# Patient Record
Sex: FEMALE | Race: ASIAN | Employment: UNEMPLOYED | URBAN - METROPOLITAN AREA
[De-identification: names, ages, dates, MRNs, and addresses within clinical notes are randomized per-mention and may not be internally consistent; named-entity substitution may affect disease eponyms.]

---

## 2017-03-13 ENCOUNTER — OFFICE VISIT (OUTPATIENT)
Dept: URGENT CARE | Facility: URGENT CARE | Age: 32
End: 2017-03-13
Payer: COMMERCIAL

## 2017-03-13 VITALS
TEMPERATURE: 98.1 F | HEART RATE: 61 BPM | DIASTOLIC BLOOD PRESSURE: 75 MMHG | OXYGEN SATURATION: 99 % | BODY MASS INDEX: 24.66 KG/M2 | WEIGHT: 118 LBS | SYSTOLIC BLOOD PRESSURE: 108 MMHG

## 2017-03-13 DIAGNOSIS — R21 RASH: Primary | ICD-10-CM

## 2017-03-13 DIAGNOSIS — L30.9 ECZEMA, UNSPECIFIED TYPE: ICD-10-CM

## 2017-03-13 PROCEDURE — 99213 OFFICE O/P EST LOW 20 MIN: CPT | Performed by: FAMILY MEDICINE

## 2017-03-13 RX ORDER — PREDNISONE 20 MG/1
TABLET ORAL
Qty: 20 TABLET | Refills: 0 | Status: SHIPPED | OUTPATIENT
Start: 2017-03-13 | End: 2018-10-22

## 2017-03-13 RX ORDER — CLOBETASOL PROPIONATE 0.5 MG/G
OINTMENT TOPICAL
COMMUNITY

## 2017-03-13 NOTE — MR AVS SNAPSHOT
After Visit Summary   3/13/2017    Christie Phoenix    MRN: [id number]           Patient Information     Date Of Birth          1985        Visit Information        Provider Department      3/13/2017 5:20 PM Jose Hamlin MD [facility]        Today's Diagnoses     Rash    -  1    Eczema, unspecified type          Care Instructions      Nonspecific Dermatitis  Dermatitis is a skin rash caused by something that touches the skin and makes it irritated and inflamed.  Your skin may be red, swollen, dry, and may be cracked. Blisters may form and ooze. The rash will itch.  Dermatitis can form on the face and neck, backs of hands, forearms, genitals, and lower legs. Dermatitis is not passed from person to person.  Talk with your health care provider about what may have caused the rash. Common things that cause skin allergies are metal in jewelry, plants like poison ivy or poison oak, and certain skin care products. You will need to avoid the source of your rash in the future to prevent it from coming back. In some cases, the cause of the dermatitis may not be found.  Treatment is done to relieve itching and prevent the rash from coming back. The rash should go away in a few days to a few weeks.  Home care  The health care provider may prescribe medications to relieve swelling and itching. Follow all instructions when using these medications.    Avoid anything that heats up your skin, such as hot showers or baths, or direct sunlight. This can make itching worse.    Stay away from whatever you think caused the rash.    Bathe in warm, not hot, water. Apply a moisturizing lotion after bathing to prevent dry skin.    Avoid skin irritants such as wool or silk clothing, grease, oils, harsh soaps, and detergents.    Apply cold compresses to soothe your sores to help relieve your symptoms. Do this for 30 minutes 3 to 4 times a day. You can make a cold compress by soaking a cloth in cold water. Squeeze  out excess water. You can add colloidal oatmeal to the water to help reduce itching. For severe itching in a small area, apply an ice pack wrapped in a thin towel. Do this for 20 minutes 3 to 4 times a day.    You can also help relieve large areas of itching by taking a lukewarm bath with colloidal oatmeal added to the water.    Use hydrocortisone cream for redness and irritation, unless another medicine was prescribed. You can also use benzocaine anesthetic cream or spray.    Use oral diphenhydramine to help reduce itching. This is an antihistamine you can buy at drug and grocery stores. It can make you sleepy, so use lower doses during the daytime. Or you can use loratadine. This is an antihistamine that will not make you sleepy. Don t use diphenhydramine if you have glaucoma or have trouble urinating because of an enlarged prostate.    Wash your hands or use an antibacterial gel often to prevent the spread of the rash.  Follow-up care  Follow up with your health care provider. Make an appointment with your health care provider if your symptoms do not get better in the next 1 to 2 weeks.  When to seek medical advice  Call your health care provider right away if any of these occur:    Spreading of the rash to other parts of your body    Severe swelling of your face, eyelids, mouth, throat or tongue    Trouble urinating due to swelling in the genital area    Fever of 100.4 F (38 C) or higher    Redness or swelling that gets worse    Pain that gets worse    Foul-smelling fluid leaking from the skin    Yellow-brown crusts on the open blisters    Joint pain     4993-9172 [employer]. [address] [place] [address], [place] [address]. All rights reserved. This information is not intended as a substitute for professional medical care. Always follow your healthcare professional's instructions.        Patient Education    Prednisone Gastro-resistant tablet    Prednisone Oral solution    Prednisone Oral  tablet  Prednisone Oral tablet  What is this medicine?  PREDNISONE (PRED ni sone) is a corticosteroid. It is commonly used to treat inflammation of the skin, joints, lungs, and other organs. Common conditions treated include asthma, allergies, and arthritis. It is also used for other conditions, such as blood disorders and diseases of the adrenal glands.  This medicine may be used for other purposes; ask your health care provider or pharmacist if you have questions.  What should I tell my health care provider before I take this medicine?  They need to know if you have any of these conditions:    Cushing's syndrome    diabetes    glaucoma    heart disease    high blood pressure    infection (especially a virus infection such as chickenpox, cold sores, or herpes)    kidney disease    liver disease    mental illness    myasthenia gravis    osteoporosis    seizures    stomach or intestine problems    thyroid disease    an unusual or allergic reaction to lactose, prednisone, other medicines, foods, dyes, or preservatives    pregnant or trying to get pregnant    breast-feeding  How should I use this medicine?  Take this medicine by mouth with a glass of water. Follow the directions on the prescription label. Take this medicine with food. If you are taking this medicine once a day, take it in the morning. Do not take more medicine than you are told to take. Do not suddenly stop taking your medicine because you may develop a severe reaction. Your doctor will tell you how much medicine to take. If your doctor wants you to stop the medicine, the dose may be slowly lowered over time to avoid any side effects.  Talk to your pediatrician regarding the use of this medicine in children. Special care may be needed.  Overdosage: If you think you have taken too much of this medicine contact a poison control center or emergency room at once.  NOTE: This medicine is only for you. Do not share this medicine with others.  What if I miss  a dose?  If you miss a dose, take it as soon as you can. If it is almost time for your next dose, talk to your doctor or health care professional. You may need to miss a dose or take an extra dose. Do not take double or extra doses without advice.  What may interact with this medicine?  Do not take this medicine with any of the following medications:    metyrapone    mifepristone  This medicine may also interact with the following medications:    aminoglutethimide    amphotericin B    aspirin and aspirin-like medicines    barbiturates    certain medicines for diabetes, like glipizide or glyburide    cholestyramine    cholinesterase inhibitors    cyclosporine    digoxin    diuretics    ephedrine    female hormones, like estrogens and birth control pills    isoniazid    ketoconazole    NSAIDS, medicines for pain and inflammation, like ibuprofen or naproxen    phenytoin    rifampin    toxoids    vaccines    warfarin  This list may not describe all possible interactions. Give your health care provider a list of all the medicines, herbs, non-prescription drugs, or dietary supplements you use. Also tell them if you smoke, drink alcohol, or use illegal drugs. Some items may interact with your medicine.  What should I watch for while using this medicine?  Visit your doctor or health care professional for regular checks on your progress. If you are taking this medicine over a prolonged period, carry an identification card with your name and address, the type and dose of your medicine, and your doctor's name and address.  This medicine may increase your risk of getting an infection. Tell your doctor or health care professional if you are around anyone with measles or chickenpox, or if you develop sores or blisters that do not heal properly.  If you are going to have surgery, tell your doctor or health care professional that you have taken this medicine within the last twelve months.  Ask your doctor or health care  professional about your diet. You may need to lower the amount of salt you eat.  This medicine may affect blood sugar levels. If you have diabetes, check with your doctor or health care professional before you change your diet or the dose of your diabetic medicine.  What side effects may I notice from receiving this medicine?  Side effects that you should report to your doctor or health care professional as soon as possible:    allergic reactions like skin rash, itching or hives, swelling of the face, lips, or tongue    changes in emotions or moods    changes in vision    depressed mood    eye pain    fever or chills, cough, sore throat, pain or difficulty passing urine    increased thirst    swelling of ankles, feet  Side effects that usually do not require medical attention (report to your doctor or health care professional if they continue or are bothersome):    confusion, excitement, restlessness    headache    nausea, vomiting    skin problems, acne, thin and shiny skin    trouble sleeping    weight gain  This list may not describe all possible side effects. Call your doctor for medical advice about side effects. You may report side effects to FDA at 3-418-EEA-5961.  Where should I keep my medicine?  Keep out of the reach of children.  Store at room temperature between 15 and 30 degrees C (59 and 86 degrees F). Protect from light. Keep container tightly closed. Throw away any unused medicine after the expiration date.  NOTE:This sheet is a summary. It may not cover all possible information. If you have questions about this medicine, talk to your doctor, pharmacist, or health care provider. Copyright  2016 [employer]              Follow-ups after your visit        Who to contact     If you have questions or need follow up information about today's clinic visit or your schedule please contact [facility] directly at [phone].  Normal or non-critical lab and imaging results will be communicated  "to you by [email or web address], letter or phone within 4 business days after the clinic has received the results. If you do not hear from us within 7 days, please contact the clinic through [employer] or phone. If you have a critical or abnormal lab result, we will notify you by phone as soon as possible.  Submit refill requests through [employer] or call your pharmacy and they will forward the refill request to us. Please allow 3 business days for your refill to be completed.          Additional Information About Your Visit        Speak[employer] Information     [employer] lets you send messages to your doctor, view your test results, renew your prescriptions, schedule appointments and more. To sign up, go to [email or web address] . Click on \"Log in\" on the left side of the screen, which will take you to the Welcome page. Then click on \"Sign up Now\" on the right side of the page.     You will be asked to enter the access code listed below, as well as some personal information. Please follow the directions to create your username and password.     Your access code is: [email or web address]  Expires: 2017  6:32 PM     Your access code will  in 90 days. If you need help or a new code, please call your [facility] clinic or [phone].        Care EveryWhere ID     This is your Care EveryWhere ID. This could be used by other organizations to access your [facility] medical records  [id number]        Your Vitals Were     Pulse Temperature Pulse Oximetry BMI (Body Mass Index)          61 98.1  F (36.7  C) (Oral) 99% 24.66 kg/m2         Blood Pressure from Last 3 Encounters:   17 108/75   14 111/82   13 109/75    Weight from Last 3 Encounters:   17 118 lb (53.5 kg)   14 107 lb 6.4 oz (48.7 kg)   13 110 lb 12.8 oz (50.3 kg)              Today, you had the following     No orders found for display         Today's Medication Changes          These changes are accurate as of: 3/13/17  6:33 PM.  If you have any " questions, ask your nurse or doctor.               Start taking these medicines.        Dose/Directions    predniSONE 20 MG tablet   Commonly known as:  DELTASONE   Used for:  Rash, Eczema, unspecified type        Take 2 tabs (40 mg) by mouth daily x 3 days, 1 tabs (20 mg) daily x 3 days, then half tab tab (10 mg) daily x 3 days   Quantity:  20 tablet   Refills:  0            Where to get your medicines      These medications were sent to [facility] [address] [place], [place] - [address] [facility]  [address] [facility] [address]     Phone:  [phone]     predniSONE 20 MG tablet                Primary Care Provider    [facility]                Thank you!     Thank you for choosing [facility]  for your care. Our goal is always to provide you with excellent care. Hearing back from our patients is one way we can continue to improve our services. Please take a few minutes to complete the written survey that you may receive in the mail after your visit with us. Thank you!             Your Updated Medication List - Protect others around you: Learn how to safely use, store and throw away your medicines at www.disposemymeds.org.          This list is accurate as of: 3/13/17  6:33 PM.  Always use your most recent med list.                   Brand Name Dispense Instructions for use    clobetasol 0.05 % ointment    TEMOVATE     At bedtime as needed       predniSONE 20 MG tablet    DELTASONE    20 tablet    Take 2 tabs (40 mg) by mouth daily x 3 days, 1 tabs (20 mg) daily x 3 days, then half tab tab (10 mg) daily x 3 days       VIREAD PO      Take 300 mg by mouth 1 tab daily

## 2017-03-13 NOTE — PROGRESS NOTES
SUBJECTIVE:                                                    Christie Phoenix is a 31 year old female who presents to clinic today for the following health issues:        Eye and mouth rash x 3 weeks      Duration: 3 weeks    Description (location/character/radiation): rash    Intensity:  moderate    Accompanying signs and symptoms: eczema     History (similar episodes/previous evaluation): eczema    Precipitating or alleviating factors:     Therapies tried and outcome: benadryl        She is known to have eczema. She complains of periorbital rash, perioral rash for last 3 weeks, associated with itch. She tried some benadryl without much success. She usually apply clobetasol ointment for eczema control. She is rehab nurse by profession.       Problem list and histories reviewed & adjusted, as indicated.  Additional history: as documented    Patient Active Problem List   Diagnosis     Hepatitis B carrier     Iron deficiency anemia     Hepatitis B infection     CARDIOVASCULAR SCREENING; LDL GOAL LESS THAN 160     No past surgical history on file.    Social History   Substance Use Topics     Smoking status: Never Smoker     Smokeless tobacco: Not on file     Alcohol use No     Family History   Problem Relation Age of Onset     Allergies Mother      seasonal         Current Outpatient Prescriptions   Medication Sig Dispense Refill     Tenofovir Disoproxil Fumarate (VIREAD PO) 1 tab daily       Allergies   Allergen Reactions     Nkda [No Known Drug Allergies]      Recent Labs   Lab Test  12/14/10   1124  08/20/10   1344  04/20/09   1107   ALT  22  43  25   CR  0.60   --    --    GFRESTIMATED  >90   --    --    GFRESTBLACK  >90   --    --       BP Readings from Last 3 Encounters:   03/13/17 108/75   01/26/14 111/82   04/27/13 109/75    Wt Readings from Last 3 Encounters:   03/13/17 118 lb (53.5 kg)   01/26/14 107 lb 6.4 oz (48.7 kg)   04/27/13 110 lb 12.8 oz (50.3 kg)                  Labs reviewed in  EPIC    ROS:  Constitutional, HEENT, cardiovascular, pulmonary, gi and gu systems are negative, except as otherwise noted.    OBJECTIVE:                                                    /75  Pulse 61  Temp 98.1  F (36.7  C) (Oral)  Wt 118 lb (53.5 kg)  SpO2 99%  BMI 24.66 kg/m2  Body mass index is 24.66 kg/(m^2).  GENERAL: healthy, alert and no distress  EYES: periorbital and perioral erythematous rashes bilaterally, no blistering or drainage   NECK: no adenopathy, no asymmetry, masses, or scars and thyroid normal to palpation  RESP: lungs clear to auscultation - no rales, rhonchi or wheezes  CV: regular rate and rhythm, normal S1 S2, no S3 or S4, no murmur, click or rub, no peripheral edema and peripheral pulses strong  ABDOMEN: soft, nontender, no hepatosplenomegaly, no masses and bowel sounds normal  MS: no gross musculoskeletal defects noted, no edema  SKIN: dry skin, eczematous rashes involving neck, hands and back      ASSESSMENT/PLAN:                                                        ICD-10-CM    1. Rash R21 predniSONE (DELTASONE) 20 MG tablet   2. Eczema, unspecified type L30.9 predniSONE (DELTASONE) 20 MG tablet       Discussed in detail differentials and further management. Symptoms are likely secondary to eczema involving facial skin. Prednisone tapering dose prescribed, common side effects discussed. Suggested to avoid clobetasol ointment on facial skin. Recommended well hydration and regular moisturizer use. Suggested to follow up with PCP for considering dermatology referral. Patient understood and in agreement with the above plan. All questions are answered.   [email or web address]  [email or web address]  MEDICATIONS:   Orders Placed This Encounter   Medications     Tenofovir Disoproxil Fumarate (VIREAD PO)     Sig: Take 300 mg by mouth 1 tab daily      clobetasol (TEMOVATE) 0.05 % ointment     Sig: At bedtime as needed     predniSONE (DELTASONE) 20 MG tablet     Sig: Take 2 tabs (40 mg) by mouth daily x 3 days, 1  tabs (20 mg) daily x 3 days, then half tab tab (10 mg) daily x 3 days     Dispense:  20 tablet     Refill:  0     Patient Instructions     Nonspecific Dermatitis  Dermatitis is a skin rash caused by something that touches the skin and makes it irritated and inflamed.  Your skin may be red, swollen, dry, and may be cracked. Blisters may form and ooze. The rash will itch.  Dermatitis can form on the face and neck, backs of hands, forearms, genitals, and lower legs. Dermatitis is not passed from person to person.  Talk with your health care provider about what may have caused the rash. Common things that cause skin allergies are metal in jewelry, plants like poison ivy or poison oak, and certain skin care products. You will need to avoid the source of your rash in the future to prevent it from coming back. In some cases, the cause of the dermatitis may not be found.  Treatment is done to relieve itching and prevent the rash from coming back. The rash should go away in a few days to a few weeks.  Home care  The health care provider may prescribe medications to relieve swelling and itching. Follow all instructions when using these medications.    Avoid anything that heats up your skin, such as hot showers or baths, or direct sunlight. This can make itching worse.    Stay away from whatever you think caused the rash.    Bathe in warm, not hot, water. Apply a moisturizing lotion after bathing to prevent dry skin.    Avoid skin irritants such as wool or silk clothing, grease, oils, harsh soaps, and detergents.    Apply cold compresses to soothe your sores to help relieve your symptoms. Do this for 30 minutes 3 to 4 times a day. You can make a cold compress by soaking a cloth in cold water. Squeeze out excess water. You can add colloidal oatmeal to the water to help reduce itching. For severe itching in a small area, apply an ice pack wrapped in a thin towel. Do this for 20 minutes 3 to 4 times a day.    You can also help  relieve large areas of itching by taking a lukewarm bath with colloidal oatmeal added to the water.    Use hydrocortisone cream for redness and irritation, unless another medicine was prescribed. You can also use benzocaine anesthetic cream or spray.    Use oral diphenhydramine to help reduce itching. This is an antihistamine you can buy at drug and grocery stores. It can make you sleepy, so use lower doses during the daytime. Or you can use loratadine. This is an antihistamine that will not make you sleepy. Don t use diphenhydramine if you have glaucoma or have trouble urinating because of an enlarged prostate.    Wash your hands or use an antibacterial gel often to prevent the spread of the rash.  Follow-up care  Follow up with your health care provider. Make an appointment with your health care provider if your symptoms do not get better in the next 1 to 2 weeks.  When to seek medical advice  Call your health care provider right away if any of these occur:    Spreading of the rash to other parts of your body    Severe swelling of your face, eyelids, mouth, throat or tongue    Trouble urinating due to swelling in the genital area    Fever of 100.4 F (38 C) or higher    Redness or swelling that gets worse    Pain that gets worse    Foul-smelling fluid leaking from the skin    Yellow-brown crusts on the open blisters    Joint pain     5026-5254 [employer]. [address] [place] [id number]. All rights reserved. This information is not intended as a substitute for professional medical care. Always follow your healthcare professional's instructions.        Patient Education    Prednisone Gastro-resistant tablet    Prednisone Oral solution    Prednisone Oral tablet  Prednisone Oral tablet  What is this medicine?  PREDNISONE (PRED ni sone) is a corticosteroid. It is commonly used to treat inflammation of the skin, joints, lungs, and other organs. Common conditions treated include asthma,  allergies, and arthritis. It is also used for other conditions, such as blood disorders and diseases of the adrenal glands.  This medicine may be used for other purposes; ask your health care provider or pharmacist if you have questions.  What should I tell my health care provider before I take this medicine?  They need to know if you have any of these conditions:    Cushing's syndrome    diabetes    glaucoma    heart disease    high blood pressure    infection (especially a virus infection such as chickenpox, cold sores, or herpes)    kidney disease    liver disease    mental illness    myasthenia gravis    osteoporosis    seizures    stomach or intestine problems    thyroid disease    an unusual or allergic reaction to lactose, prednisone, other medicines, foods, dyes, or preservatives    pregnant or trying to get pregnant    breast-feeding  How should I use this medicine?  Take this medicine by mouth with a glass of water. Follow the directions on the prescription label. Take this medicine with food. If you are taking this medicine once a day, take it in the morning. Do not take more medicine than you are told to take. Do not suddenly stop taking your medicine because you may develop a severe reaction. Your doctor will tell you how much medicine to take. If your doctor wants you to stop the medicine, the dose may be slowly lowered over time to avoid any side effects.  Talk to your pediatrician regarding the use of this medicine in children. Special care may be needed.  Overdosage: If you think you have taken too much of this medicine contact a poison control center or emergency room at once.  NOTE: This medicine is only for you. Do not share this medicine with others.  What if I miss a dose?  If you miss a dose, take it as soon as you can. If it is almost time for your next dose, talk to your doctor or health care professional. You may need to miss a dose or take an extra dose. Do not take double or extra doses  without advice.  What may interact with this medicine?  Do not take this medicine with any of the following medications:    metyrapone    mifepristone  This medicine may also interact with the following medications:    aminoglutethimide    amphotericin B    aspirin and aspirin-like medicines    barbiturates    certain medicines for diabetes, like glipizide or glyburide    cholestyramine    cholinesterase inhibitors    cyclosporine    digoxin    diuretics    ephedrine    female hormones, like estrogens and birth control pills    isoniazid    ketoconazole    NSAIDS, medicines for pain and inflammation, like ibuprofen or naproxen    phenytoin    rifampin    toxoids    vaccines    warfarin  This list may not describe all possible interactions. Give your health care provider a list of all the medicines, herbs, non-prescription drugs, or dietary supplements you use. Also tell them if you smoke, drink alcohol, or use illegal drugs. Some items may interact with your medicine.  What should I watch for while using this medicine?  Visit your doctor or health care professional for regular checks on your progress. If you are taking this medicine over a prolonged period, carry an identification card with your name and address, the type and dose of your medicine, and your doctor's name and address.  This medicine may increase your risk of getting an infection. Tell your doctor or health care professional if you are around anyone with measles or chickenpox, or if you develop sores or blisters that do not heal properly.  If you are going to have surgery, tell your doctor or health care professional that you have taken this medicine within the last twelve months.  Ask your doctor or health care professional about your diet. You may need to lower the amount of salt you eat.  This medicine may affect blood sugar levels. If you have diabetes, check with your doctor or health care professional before you change your diet or the dose of  your diabetic medicine.  What side effects may I notice from receiving this medicine?  Side effects that you should report to your doctor or health care professional as soon as possible:    allergic reactions like skin rash, itching or hives, swelling of the face, lips, or tongue    changes in emotions or moods    changes in vision    depressed mood    eye pain    fever or chills, cough, sore throat, pain or difficulty passing urine    increased thirst    swelling of ankles, feet  Side effects that usually do not require medical attention (report to your doctor or health care professional if they continue or are bothersome):    confusion, excitement, restlessness    headache    nausea, vomiting    skin problems, acne, thin and shiny skin    trouble sleeping    weight gain  This list may not describe all possible side effects. Call your doctor for medical advice about side effects. You may report side effects to FDA at 5-833-FDA-6006.  Where should I keep my medicine?  Keep out of the reach of children.  Store at room temperature between 15 and 30 degrees C (59 and 86 degrees F). Protect from light. Keep container tightly closed. Throw away any unused medicine after the expiration date.  NOTE:This sheet is a summary. It may not cover all possible information. If you have questions about this medicine, talk to your doctor, pharmacist, or health care provider. Copyright  2016 [employer]            Jose Hamlin MD  [facility]

## 2017-03-13 NOTE — PATIENT INSTRUCTIONS
Nonspecific Dermatitis  Dermatitis is a skin rash caused by something that touches the skin and makes it irritated and inflamed.  Your skin may be red, swollen, dry, and may be cracked. Blisters may form and ooze. The rash will itch.  Dermatitis can form on the face and neck, backs of hands, forearms, genitals, and lower legs. Dermatitis is not passed from person to person.  Talk with your health care provider about what may have caused the rash. Common things that cause skin allergies are metal in jewelry, plants like poison ivy or poison oak, and certain skin care products. You will need to avoid the source of your rash in the future to prevent it from coming back. In some cases, the cause of the dermatitis may not be found.  Treatment is done to relieve itching and prevent the rash from coming back. The rash should go away in a few days to a few weeks.  Home care  The health care provider may prescribe medications to relieve swelling and itching. Follow all instructions when using these medications.    Avoid anything that heats up your skin, such as hot showers or baths, or direct sunlight. This can make itching worse.    Stay away from whatever you think caused the rash.    Bathe in warm, not hot, water. Apply a moisturizing lotion after bathing to prevent dry skin.    Avoid skin irritants such as wool or silk clothing, grease, oils, harsh soaps, and detergents.    Apply cold compresses to soothe your sores to help relieve your symptoms. Do this for 30 minutes 3 to 4 times a day. You can make a cold compress by soaking a cloth in cold water. Squeeze out excess water. You can add colloidal oatmeal to the water to help reduce itching. For severe itching in a small area, apply an ice pack wrapped in a thin towel. Do this for 20 minutes 3 to 4 times a day.    You can also help relieve large areas of itching by taking a lukewarm bath with colloidal oatmeal added to the water.    Use hydrocortisone cream for redness  and irritation, unless another medicine was prescribed. You can also use benzocaine anesthetic cream or spray.    Use oral diphenhydramine to help reduce itching. This is an antihistamine you can buy at drug and grocery stores. It can make you sleepy, so use lower doses during the daytime. Or you can use loratadine. This is an antihistamine that will not make you sleepy. Don t use diphenhydramine if you have glaucoma or have trouble urinating because of an enlarged prostate.    Wash your hands or use an antibacterial gel often to prevent the spread of the rash.  Follow-up care  Follow up with your health care provider. Make an appointment with your health care provider if your symptoms do not get better in the next 1 to 2 weeks.  When to seek medical advice  Call your health care provider right away if any of these occur:    Spreading of the rash to other parts of your body    Severe swelling of your face, eyelids, mouth, throat or tongue    Trouble urinating due to swelling in the genital area    Fever of 100.4 F (38 C) or higher    Redness or swelling that gets worse    Pain that gets worse    Foul-smelling fluid leaking from the skin    Yellow-brown crusts on the open blisters    Joint pain     3002-5671 [employer]. [address] [place] [address], [place] [address]. All rights reserved. This information is not intended as a substitute for professional medical care. Always follow your healthcare professional's instructions.        Patient Education    Prednisone Gastro-resistant tablet    Prednisone Oral solution    Prednisone Oral tablet  Prednisone Oral tablet  What is this medicine?  PREDNISONE (PRED ni sone) is a corticosteroid. It is commonly used to treat inflammation of the skin, joints, lungs, and other organs. Common conditions treated include asthma, allergies, and arthritis. It is also used for other conditions, such as blood disorders and diseases of the adrenal glands.  This medicine may be  used for other purposes; ask your health care provider or pharmacist if you have questions.  What should I tell my health care provider before I take this medicine?  They need to know if you have any of these conditions:    Cushing's syndrome    diabetes    glaucoma    heart disease    high blood pressure    infection (especially a virus infection such as chickenpox, cold sores, or herpes)    kidney disease    liver disease    mental illness    myasthenia gravis    osteoporosis    seizures    stomach or intestine problems    thyroid disease    an unusual or allergic reaction to lactose, prednisone, other medicines, foods, dyes, or preservatives    pregnant or trying to get pregnant    breast-feeding  How should I use this medicine?  Take this medicine by mouth with a glass of water. Follow the directions on the prescription label. Take this medicine with food. If you are taking this medicine once a day, take it in the morning. Do not take more medicine than you are told to take. Do not suddenly stop taking your medicine because you may develop a severe reaction. Your doctor will tell you how much medicine to take. If your doctor wants you to stop the medicine, the dose may be slowly lowered over time to avoid any side effects.  Talk to your pediatrician regarding the use of this medicine in children. Special care may be needed.  Overdosage: If you think you have taken too much of this medicine contact a poison control center or emergency room at once.  NOTE: This medicine is only for you. Do not share this medicine with others.  What if I miss a dose?  If you miss a dose, take it as soon as you can. If it is almost time for your next dose, talk to your doctor or health care professional. You may need to miss a dose or take an extra dose. Do not take double or extra doses without advice.  What may interact with this medicine?  Do not take this medicine with any of the following  medications:    metyrapone    mifepristone  This medicine may also interact with the following medications:    aminoglutethimide    amphotericin B    aspirin and aspirin-like medicines    barbiturates    certain medicines for diabetes, like glipizide or glyburide    cholestyramine    cholinesterase inhibitors    cyclosporine    digoxin    diuretics    ephedrine    female hormones, like estrogens and birth control pills    isoniazid    ketoconazole    NSAIDS, medicines for pain and inflammation, like ibuprofen or naproxen    phenytoin    rifampin    toxoids    vaccines    warfarin  This list may not describe all possible interactions. Give your health care provider a list of all the medicines, herbs, non-prescription drugs, or dietary supplements you use. Also tell them if you smoke, drink alcohol, or use illegal drugs. Some items may interact with your medicine.  What should I watch for while using this medicine?  Visit your doctor or health care professional for regular checks on your progress. If you are taking this medicine over a prolonged period, carry an identification card with your name and address, the type and dose of your medicine, and your doctor's name and address.  This medicine may increase your risk of getting an infection. Tell your doctor or health care professional if you are around anyone with measles or chickenpox, or if you develop sores or blisters that do not heal properly.  If you are going to have surgery, tell your doctor or health care professional that you have taken this medicine within the last twelve months.  Ask your doctor or health care professional about your diet. You may need to lower the amount of salt you eat.  This medicine may affect blood sugar levels. If you have diabetes, check with your doctor or health care professional before you change your diet or the dose of your diabetic medicine.  What side effects may I notice from receiving this medicine?  Side effects that you  should report to your doctor or health care professional as soon as possible:    allergic reactions like skin rash, itching or hives, swelling of the face, lips, or tongue    changes in emotions or moods    changes in vision    depressed mood    eye pain    fever or chills, cough, sore throat, pain or difficulty passing urine    increased thirst    swelling of ankles, feet  Side effects that usually do not require medical attention (report to your doctor or health care professional if they continue or are bothersome):    confusion, excitement, restlessness    headache    nausea, vomiting    skin problems, acne, thin and shiny skin    trouble sleeping    weight gain  This list may not describe all possible side effects. Call your doctor for medical advice about side effects. You may report side effects to FDA at 9-026-FDA-7624.  Where should I keep my medicine?  Keep out of the reach of children.  Store at room temperature between 15 and 30 degrees C (59 and 86 degrees F). Protect from light. Keep container tightly closed. Throw away any unused medicine after the expiration date.  NOTE:This sheet is a summary. It may not cover all possible information. If you have questions about this medicine, talk to your doctor, pharmacist, or health care provider. Copyright  2016 [employer]

## 2018-10-05 DIAGNOSIS — B18.1 HEPATITIS B CARRIER (H): Primary | ICD-10-CM

## 2018-10-22 ENCOUNTER — OFFICE VISIT (OUTPATIENT)
Dept: GASTROENTEROLOGY | Facility: CLINIC | Age: 33
End: 2018-10-22
Attending: INTERNAL MEDICINE
Payer: COMMERCIAL

## 2018-10-22 VITALS
WEIGHT: 116.6 LBS | TEMPERATURE: 98.2 F | HEIGHT: 58 IN | HEART RATE: 63 BPM | BODY MASS INDEX: 24.48 KG/M2 | SYSTOLIC BLOOD PRESSURE: 115 MMHG | DIASTOLIC BLOOD PRESSURE: 71 MMHG | OXYGEN SATURATION: 98 %

## 2018-10-22 DIAGNOSIS — B18.1 CHRONIC VIRAL HEPATITIS B WITHOUT DELTA AGENT AND WITHOUT COMA (H): Primary | ICD-10-CM

## 2018-10-22 DIAGNOSIS — B18.1 HEPATITIS B CARRIER (H): ICD-10-CM

## 2018-10-22 DIAGNOSIS — B18.1 CHRONIC VIRAL HEPATITIS B WITHOUT DELTA AGENT AND WITHOUT COMA (H): ICD-10-CM

## 2018-10-22 LAB
ALBUMIN SERPL-MCNC: 3.9 G/DL (ref 3.4–5)
ALP SERPL-CCNC: 38 U/L (ref 40–150)
ALT SERPL W P-5'-P-CCNC: 24 U/L (ref 0–50)
ANION GAP SERPL CALCULATED.3IONS-SCNC: 7 MMOL/L (ref 3–14)
AST SERPL W P-5'-P-CCNC: 17 U/L (ref 0–45)
BILIRUB DIRECT SERPL-MCNC: 0.2 MG/DL (ref 0–0.2)
BILIRUB SERPL-MCNC: 0.9 MG/DL (ref 0.2–1.3)
BUN SERPL-MCNC: 16 MG/DL (ref 7–30)
CALCIUM SERPL-MCNC: 8.4 MG/DL (ref 8.5–10.1)
CHLORIDE SERPL-SCNC: 108 MMOL/L (ref 94–109)
CO2 SERPL-SCNC: 23 MMOL/L (ref 20–32)
CREAT SERPL-MCNC: 0.74 MG/DL (ref 0.52–1.04)
ERYTHROCYTE [DISTWIDTH] IN BLOOD BY AUTOMATED COUNT: 11.8 % (ref 10–15)
GFR SERPL CREATININE-BSD FRML MDRD: >90 ML/MIN/1.7M2
GLUCOSE SERPL-MCNC: 87 MG/DL (ref 70–99)
HCT VFR BLD AUTO: 42.7 % (ref 35–47)
HGB BLD-MCNC: 13.6 G/DL (ref 11.7–15.7)
INR PPP: 1.04 (ref 0.86–1.14)
MCH RBC QN AUTO: 30.3 PG (ref 26.5–33)
MCHC RBC AUTO-ENTMCNC: 31.9 G/DL (ref 31.5–36.5)
MCV RBC AUTO: 95 FL (ref 78–100)
PLATELET # BLD AUTO: 218 10E9/L (ref 150–450)
POTASSIUM SERPL-SCNC: 4.4 MMOL/L (ref 3.4–5.3)
PROT SERPL-MCNC: 7.6 G/DL (ref 6.8–8.8)
RBC # BLD AUTO: 4.49 10E12/L (ref 3.8–5.2)
SODIUM SERPL-SCNC: 137 MMOL/L (ref 133–144)
WBC # BLD AUTO: 5.8 10E9/L (ref 4–11)

## 2018-10-22 PROCEDURE — 85610 PROTHROMBIN TIME: CPT | Performed by: INTERNAL MEDICINE

## 2018-10-22 PROCEDURE — 80048 BASIC METABOLIC PNL TOTAL CA: CPT | Performed by: INTERNAL MEDICINE

## 2018-10-22 PROCEDURE — 80076 HEPATIC FUNCTION PANEL: CPT | Performed by: INTERNAL MEDICINE

## 2018-10-22 PROCEDURE — 85027 COMPLETE CBC AUTOMATED: CPT | Performed by: INTERNAL MEDICINE

## 2018-10-22 PROCEDURE — 36415 COLL VENOUS BLD VENIPUNCTURE: CPT | Performed by: INTERNAL MEDICINE

## 2018-10-22 PROCEDURE — G0463 HOSPITAL OUTPT CLINIC VISIT: HCPCS | Mod: ZF

## 2018-10-22 PROCEDURE — 87517 HEPATITIS B DNA QUANT: CPT | Performed by: INTERNAL MEDICINE

## 2018-10-22 PROCEDURE — 91200 LIVER ELASTOGRAPHY: CPT | Mod: ZF

## 2018-10-22 RX ORDER — TENOFOVIR DISOPROXIL FUMARATE 300 MG/1
300 TABLET, FILM COATED ORAL DAILY
Qty: 90 TABLET | Refills: 3 | Status: SHIPPED | OUTPATIENT
Start: 2018-10-22 | End: 2019-11-25

## 2018-10-22 ASSESSMENT — PAIN SCALES - GENERAL: PAINLEVEL: NO PAIN (0)

## 2018-10-22 NOTE — LETTER
Date:October 23, 2018      Patient was self referred, no letter generated. Do not send.        [facility] Health Information

## 2018-10-22 NOTE — LETTER
"10/22/2018      RE: Christie Phoenix  [address] [facility] [address]       A/P  32 year old yo female with HBV. Has been on tenofovir for 7 years. Liver tests have been normal and  HBV DNA has been undetectable. Today's pending.     CT done July showed normal liver.  Will get fibrosis scan today.  Continue daily tenofovir and every 6 months US and labs. RTC 1 year.    Reviewed side effects from tenofovir.    Refuses flu shot.  This was a 25 minute visit, over 50% counseling and coordination of care.    Samra Steinberg MD  Hepatology/Liver Transplant  Medical Director, Liver Transplantation  [facility]  ==================================================================      S:  32 year old you female with hepatitis B. She followed with me at . On Viread for about 7 years. She takes it daily and has had a negative HBV DNA and normal liver tests for a number of years.    She had a c/o easy bruising in the past and saw hematology. Testing did not show any abnormalities. She asks today about long term side effects from tenofovir.      Hep B e Ag pos  Hep b e Ruby neg  HBV DNA pending  Liver tests pending  HIV neg 2015  HCV no record    CT 7/23/18 Liver normal. Done for pyelonephritis    Doing well. No problems getting medication on time and taking it every day. No new health problems. No new family history.    Soc: She works at [employer]. She has 6 children. Ages 7-16. No plans for any more kids.    O:  /71  Pulse 63  Temp 98.2  F (36.8  C) (Oral)  Ht 1.473 m (4' 10\")  Wt 52.9 kg (116 lb 9.6 oz)  SpO2 98%  BMI 24.37 kg/m2  Gen: No acute distress  Head: Normocephalic atraumatic  Eyes: Sclera anicteric  Neck: No cervical lymphadenopathy  Respiratory: Clear to auscultation bilaterally, no overt wheezing or rales  CV: RRR   Abdomen:  Soft, nontender, nondistended, normal bowel sounds  Extrem: No edema  Skin: No jaundice, spider angiomata or palmar erythema.  No rashes.   Neuro: Alert and " oriented. No asterixis.    MSK: Grossly Intact  Psych: Normal speech, normal affect    Current Outpatient Prescriptions   Medication     clobetasol (TEMOVATE) 0.05 % ointment     Tenofovir Disoproxil Fumarate (VIREAD PO)     No current facility-administered medications for this visit.        Samra Steinberg MD

## 2018-10-22 NOTE — PROGRESS NOTES
"A/P  32 year old yo female with HBV. Has been on tenofovir for 7 years. Liver tests have been normal and  HBV DNA has been undetectable. Today's pending.     CT done July showed normal liver.  Will get fibrosis scan today.  Continue daily tenofovir and every 6 months US and labs. RTC 1 year.    Reviewed side effects from tenofovir.    Refuses flu shot.  This was a 25 minute visit, over 50% counseling and coordination of care.    Samra Steinberg MD  Hepatology/Liver Transplant  Medical Director, Liver Transplantation  [facility]  ==================================================================      S:  32 year old you female with hepatitis B. She followed with me at . On Viread for about 7 years. She takes it daily and has had a negative HBV DNA and normal liver tests for a number of years.    She had a c/o easy bruising in the past and saw hematology. Testing did not show any abnormalities. She asks today about long term side effects from tenofovir.      Hep B e Ag pos  Hep b e Ruby neg  HBV DNA pending  Liver tests pending  HIV neg 2015  HCV no record    CT 7/23/18 Liver normal. Done for pyelonephritis    Doing well. No problems getting medication on time and taking it every day. No new health problems. No new family history.    Soc: She works at [employer]. She has 6 children. Ages 7-16. No plans for any more kids.    O:  /71  Pulse 63  Temp 98.2  F (36.8  C) (Oral)  Ht 1.473 m (4' 10\")  Wt 52.9 kg (116 lb 9.6 oz)  SpO2 98%  BMI 24.37 kg/m2  Gen: No acute distress  Head: Normocephalic atraumatic  Eyes: Sclera anicteric  Neck: No cervical lymphadenopathy  Respiratory: Clear to auscultation bilaterally, no overt wheezing or rales  CV: RRR   Abdomen:  Soft, nontender, nondistended, normal bowel sounds  Extrem: No edema  Skin: No jaundice, spider angiomata or palmar erythema.  No rashes.   Neuro: Alert and oriented. No asterixis.    MSK: Grossly Intact  Psych: Normal speech, normal " affect    Current Outpatient Prescriptions   Medication     clobetasol (TEMOVATE) 0.05 % ointment     Tenofovir Disoproxil Fumarate (VIREAD PO)     No current facility-administered medications for this visit.

## 2018-10-22 NOTE — MR AVS SNAPSHOT
After Visit Summary   10/22/2018    Christie Phoenix    MRN: [id number]           Patient Information     Date Of Birth          1985        Visit Information        Provider Department      10/22/2018 9:30 AM Samra Steinberg MD [facility] Hepatology        Today's Diagnoses     Chronic viral hepatitis B without delta agent and without coma (H)    -  1       Follow-ups after your visit        Follow-up notes from your care team     Return in about 1 year (around 10/22/2019).      Your next 10 appointments already scheduled     Oct 18, 2019  8:30 AM CDT   Lab with  LAB   [facility] Lab ([facility])    [address]  1st Floor  [facility] [id number] [phone]            Oct 18, 2019  9:30 AM CDT   (Arrive by 9:15 AM)   Return General Liver with Samra Steinberg MD   [facility] Hepatology ([facility])    [address]  [address]  [facility] [address] [phone]              Who to contact     If you have questions or need follow up information about today's clinic visit or your schedule please contact [facility] HEPATOLOGY directly at [phone].  Normal or non-critical lab and imaging results will be communicated to you by MyChart, letter or phone within 4 business days after the clinic has received the results. If you do not hear from us within 7 days, please contact the clinic through MyChart or phone. If you have a critical or abnormal lab result, we will notify you by phone as soon as possible.  Submit refill requests through [employer] or call your pharmacy and they will forward the refill request to us. Please allow 3 business days for your refill to be completed.          Additional Information About Your Visit        [employer] Information     Tenfoot lets you send messages to your doctor, view your test results, renew your prescriptions, schedule appointments and more. To sign up, go to  [email or web address] . Click on \"Log in\" on the left side of the screen, which will take you to the Welcome page. Then click on \"Sign up Now\" on the right side of the page.     You will be asked to enter the access code listed below, as well as some personal information. Please follow the directions to create your username and password.     Your access code is: [email or web address]  Expires: 2019  6:30 AM     Your access code will  in 90 days. If you need help or a new code, please call your [facility] clinic or [phone].        Care EveryWhere ID     This is your Care EveryWhere ID. This could be used by other organizations to access your [facility] medical records  [id number]        Your Vitals Were     Pulse Temperature Height Pulse Oximetry BMI (Body Mass Index)       63 98.2  F (36.8  C) (Oral) 1.473 m (4' 10\") 98% 24.37 kg/m2        Blood Pressure from Last 3 Encounters:   10/22/18 115/71   17 108/75   14 111/82    Weight from Last 3 Encounters:   10/22/18 52.9 kg (116 lb 9.6 oz)   17 53.5 kg (118 lb)   14 48.7 kg (107 lb 6.4 oz)              Today, you had the following     No orders found for display         Today's Medication Changes          These changes are accurate as of 10/22/18  5:10 PM.  If you have any questions, ask your nurse or doctor.               These medicines have changed or have updated prescriptions.        Dose/Directions    tenofovir 300 MG tablet   Commonly known as:  VIREAD   This may have changed:    - medication strength  - when to take this   Used for:  Chronic viral hepatitis B without delta agent and without coma (H)   Changed by:  Samra Steinberg MD        Dose:  300 mg   Take 1 tablet (300 mg) by mouth daily 1 tab daily   Quantity:  90 tablet   Refills:  3            Where to get your medicines      These medications were sent to [facility] - [place], [place] - [address], [place] " [address]     Phone:  [phone]     tenofovir 300 MG tablet                Primary Care Provider    None Specified       No primary provider on file.        Equal Access to Services     ORA NORRIS : Hadii aad ku hadfabyneyda Hansel, rasheedaivelisse simeonsiobhanha, zandra jeffmichael veronndayo, froilan kaylain hayaajaky juniorcosme gong sanjuana noriega. So [facility] [phone].    ATENCIÓN: Si habla español, tiene a mo disposición servicios gratuitos de asistencia lingüística. Llame al [phone].    We comply with applicable federal civil rights laws and [place] laws. We do not discriminate on the basis of race, color, national origin, age, disability, sex, sexual orientation, or gender identity.            Thank you!     Thank you for choosing [facility] HEPATOLOGY  for your care. Our goal is always to provide you with excellent care. Hearing back from our patients is one way we can continue to improve our services. Please take a few minutes to complete the written survey that you may receive in the mail after your visit with us. Thank you!             Your Updated Medication List - Protect others around you: Learn how to safely use, store and throw away your medicines at www.disposemymeds.org.          This list is accurate as of 10/22/18  5:10 PM.  Always use your most recent med list.                   Brand Name Dispense Instructions for use Diagnosis    clobetasol 0.05 % ointment    TEMOVATE     At bedtime as needed        tenofovir 300 MG tablet    VIREAD    90 tablet    Take 1 tablet (300 mg) by mouth daily 1 tab daily    Chronic viral hepatitis B without delta agent and without coma (H)

## 2018-10-23 LAB
HBV DNA SERPL NAA+PROBE-ACNC: NORMAL [IU]/ML
HBV DNA SERPL NAA+PROBE-LOG IU: NORMAL {LOG_IU}/ML

## 2018-10-24 DIAGNOSIS — B18.1 HEPATITIS B CARRIER (H): Primary | ICD-10-CM

## 2019-11-15 DIAGNOSIS — B18.1 HEPATITIS B CARRIER (H): Primary | ICD-10-CM

## 2019-11-15 DIAGNOSIS — B18.1 CHRONIC VIRAL HEPATITIS B WITHOUT DELTA AGENT AND WITHOUT COMA (H): ICD-10-CM

## 2019-11-21 ENCOUNTER — PRE VISIT (OUTPATIENT)
Dept: GASTROENTEROLOGY | Facility: CLINIC | Age: 34
End: 2019-11-21

## 2019-11-21 NOTE — PROGRESS NOTES
Was the patient contacted by phone and reminded of the upcoming visit? message left    Was the patient instructed to bring a current list of all medications to the appointment or instructed to bring in all medication bottles? Yes     Was the patient instructed to arrive prior to the appointment time to have ordered labs drawn? Yes     Were the needed lab orders placed? Yes    Was lab appointment made 1 hour or more prior to visit? Yes    Patient instructed to arrive early for check-in    Maria E Lynch [facility]  11/21/2019 11:43 AM

## 2019-11-25 ENCOUNTER — OFFICE VISIT (OUTPATIENT)
Dept: GASTROENTEROLOGY | Facility: CLINIC | Age: 34
End: 2019-11-25
Attending: INTERNAL MEDICINE
Payer: COMMERCIAL

## 2019-11-25 VITALS
HEART RATE: 52 BPM | WEIGHT: 121.2 LBS | OXYGEN SATURATION: 100 % | SYSTOLIC BLOOD PRESSURE: 125 MMHG | DIASTOLIC BLOOD PRESSURE: 85 MMHG | BODY MASS INDEX: 25.33 KG/M2 | TEMPERATURE: 97.7 F

## 2019-11-25 DIAGNOSIS — B18.1 HEPATITIS B CARRIER (H): ICD-10-CM

## 2019-11-25 DIAGNOSIS — B16.1 VIRAL HEPATITIS B WITH HEPATITIS DELTA, WITHOUT HEPATIC COMA, UNSPECIFIED CHRONICITY: Primary | ICD-10-CM

## 2019-11-25 DIAGNOSIS — B18.1 CHRONIC VIRAL HEPATITIS B WITHOUT DELTA AGENT AND WITHOUT COMA (H): ICD-10-CM

## 2019-11-25 LAB
ALBUMIN SERPL-MCNC: 4.2 G/DL (ref 3.4–5)
ALP SERPL-CCNC: 50 U/L (ref 40–150)
ALT SERPL W P-5'-P-CCNC: 29 U/L (ref 0–50)
ANION GAP SERPL CALCULATED.3IONS-SCNC: 4 MMOL/L (ref 3–14)
AST SERPL W P-5'-P-CCNC: 22 U/L (ref 0–45)
BILIRUB DIRECT SERPL-MCNC: 0.2 MG/DL (ref 0–0.2)
BILIRUB SERPL-MCNC: 0.9 MG/DL (ref 0.2–1.3)
BUN SERPL-MCNC: 13 MG/DL (ref 7–30)
CALCIUM SERPL-MCNC: 9.1 MG/DL (ref 8.5–10.1)
CHLORIDE SERPL-SCNC: 107 MMOL/L (ref 94–109)
CO2 SERPL-SCNC: 26 MMOL/L (ref 20–32)
CREAT SERPL-MCNC: 0.8 MG/DL (ref 0.52–1.04)
ERYTHROCYTE [DISTWIDTH] IN BLOOD BY AUTOMATED COUNT: 11.9 % (ref 10–15)
GFR SERPL CREATININE-BSD FRML MDRD: >90 ML/MIN/{1.73_M2}
GLUCOSE SERPL-MCNC: 84 MG/DL (ref 70–99)
HCT VFR BLD AUTO: 44.7 % (ref 35–47)
HGB BLD-MCNC: 14.3 G/DL (ref 11.7–15.7)
INR PPP: 1.03 (ref 0.86–1.14)
MCH RBC QN AUTO: 30 PG (ref 26.5–33)
MCHC RBC AUTO-ENTMCNC: 32 G/DL (ref 31.5–36.5)
MCV RBC AUTO: 94 FL (ref 78–100)
PLATELET # BLD AUTO: 205 10E9/L (ref 150–450)
POTASSIUM SERPL-SCNC: 3.9 MMOL/L (ref 3.4–5.3)
PROT SERPL-MCNC: 8.2 G/DL (ref 6.8–8.8)
RBC # BLD AUTO: 4.76 10E12/L (ref 3.8–5.2)
SODIUM SERPL-SCNC: 138 MMOL/L (ref 133–144)
WBC # BLD AUTO: 4.5 10E9/L (ref 4–11)

## 2019-11-25 PROCEDURE — 36415 COLL VENOUS BLD VENIPUNCTURE: CPT | Performed by: INTERNAL MEDICINE

## 2019-11-25 PROCEDURE — 80076 HEPATIC FUNCTION PANEL: CPT | Performed by: INTERNAL MEDICINE

## 2019-11-25 PROCEDURE — 80048 BASIC METABOLIC PNL TOTAL CA: CPT | Performed by: INTERNAL MEDICINE

## 2019-11-25 PROCEDURE — G0463 HOSPITAL OUTPT CLINIC VISIT: HCPCS | Mod: ZF

## 2019-11-25 PROCEDURE — 87517 HEPATITIS B DNA QUANT: CPT | Performed by: INTERNAL MEDICINE

## 2019-11-25 PROCEDURE — 85027 COMPLETE CBC AUTOMATED: CPT | Performed by: INTERNAL MEDICINE

## 2019-11-25 PROCEDURE — 85610 PROTHROMBIN TIME: CPT | Performed by: INTERNAL MEDICINE

## 2019-11-25 RX ORDER — TENOFOVIR DISOPROXIL FUMARATE 300 MG/1
300 TABLET, FILM COATED ORAL DAILY
Qty: 90 TABLET | Refills: 3 | Status: SHIPPED | OUTPATIENT
Start: 2019-11-25 | End: 2020-11-20

## 2019-11-25 ASSESSMENT — PAIN SCALES - GENERAL: PAINLEVEL: NO PAIN (0)

## 2019-11-25 NOTE — LETTER
11/25/2019      RE: Christie Phoenix  [address]  [facility] [address]       A/P  34 year old yo female with HBV. Has been on tenofovir  since 2011. Liver tests have been normal and HBV DNA has been undetectable.    Fibrosis scan 2018 F0-F1.  Continue daily tenofovir and every 6 months US and labs. RTC 1 year.    This was a 20 minute visit, over 50% counseling and coordination of care.     Samra Steinberg MD  Hepatology/Liver Transplant  Medical Director, Liver Transplantation  [facility]  ==================================================================  S:  34 year old you female with hepatitis B. She followed with me at  in the past. On Viread since about 2011. She takes it daily and has had a negative HBV DNA and normal liver tests for a number of years.    Fibrosis scan 2018 F0-F1.     She had a c/o easy bruising in the past and saw hematology. Testing did not show any abnormalities.  She said she still has this. Otherwise she said she is feeling well.      Hep B e Ag pos  Hep b e Ruby neg  HBV DNA pending  Liver tests pending  HIV neg 2015  HCV no record     CT 7/23/18 Liver normal. Done for pyelonephritis    Doing well. No problems getting medication on time and taking it every day. No new health problems. No new family history.    Lab Test 11/25/19  0843   PROTTOTAL 8.2   ALBUMIN 4.2   BILITOTAL 0.9   ALKPHOS 50   AST 22   ALT 29     CBC RESULTS:   Recent Labs   Lab Test 11/25/19  0843   WBC 4.5   RBC 4.76   HGB 14.3   HCT 44.7   MCV 94   MCH 30.0   MCHC 32.0   RDW 11.9        Soc: She works at [employer]. She has 6 children. Ages 8-167 No plans for any more kids.     O:  /85 (BP Location: Right arm, Patient Position: Sitting, Cuff Size: Adult Regular)   Pulse 52   Temp 97.7  F (36.5  C)   Wt 55 kg (121 lb 3.2 oz)   SpO2 100%   BMI 25.33 kg/m     Gen: No acute distress  Head: Normocephalic atraumatic  Eyes: Sclera anicteric  Extrem: No edema  Skin: No jaundice, spider  angiomata or palmar erythema.  No rashes.   Neuro: Alert and oriented. No asterixis.    MSK: Grossly Intact  Psych: Normal speech, normal affect    Samra Steinberg MD

## 2019-11-25 NOTE — PROGRESS NOTES
A/P  34 year old yo female with HBV. Has been on tenofovir since 2011. Liver tests have been normal and HBV DNA has been undetectable.    Fibrosis scan 2018 F0-F1.  Continue daily tenofovir and every 6 months US and labs. RTC 1 year.    This was a 20 minute visit, over 50% counseling and coordination of care.     Samra Steinberg MD  Hepatology/Liver Transplant  Medical Director, Liver Transplantation  [facility]  ==================================================================  S:  34 year old you female with hepatitis B. She followed with me at  in the past. On Viread since about 2011. She takes it daily and has had a negative HBV DNA and normal liver tests for a number of years.    Fibrosis scan 2018 F0-F1.     She had a c/o easy bruising in the past and saw hematology. Testing did not show any abnormalities. She said she still has this. Otherwise she said she is feeling well.      Hep B e Ag pos  Hep b e Ruby neg  HBV DNA pending  Liver tests pending  HIV neg 2015  HCV no record     CT 7/23/18 Liver normal. Done for pyelonephritis    Doing well. No problems getting medication on time and taking it every day. No new health problems. No new family history.    Lab Test 11/25/19  0843   PROTTOTAL 8.2   ALBUMIN 4.2   BILITOTAL 0.9   ALKPHOS 50   AST 22   ALT 29     CBC RESULTS:   Recent Labs   Lab Test 11/25/19  0843   WBC 4.5   RBC 4.76   HGB 14.3   HCT 44.7   MCV 94   MCH 30.0   MCHC 32.0   RDW 11.9        Soc: She works at [employer]. She has 6 children. Ages 8-167 No plans for any more kids.     O:  /85 (BP Location: Right arm, Patient Position: Sitting, Cuff Size: Adult Regular)   Pulse 52   Temp 97.7  F (36.5  C)   Wt 55 kg (121 lb 3.2 oz)   SpO2 100%   BMI 25.33 kg/m    Gen: No acute distress  Head: Normocephalic atraumatic  Eyes: Sclera anicteric  Extrem: No edema  Skin: No jaundice, spider angiomata or palmar erythema.  No rashes.   Neuro: Alert and oriented. No asterixis.     MSK: Grossly Intact  Psych: Normal speech, normal affect

## 2019-11-25 NOTE — LETTER
Date:November 26, 2019      Patient was self referred, no letter generated. Do not send.        [facility] Health Information

## 2019-11-25 NOTE — NURSING NOTE
Chief Complaint   Patient presents with     RECHECK     annual follow up       /85 (BP Location: Right arm, Patient Position: Sitting, Cuff Size: Adult Regular)   Pulse 52   Temp 97.7  F (36.5  C)   Wt 55 kg (121 lb 3.2 oz)   SpO2 100%   BMI 25.33 kg/m        Markie Hurtado, EMT

## 2019-11-25 NOTE — LETTER
11/25/2019       RE: Christie Phoenix  [address] [facility] [address]     Dear Colleague,    Thank you for referring your patient, Christie Phoenix, to the [facility] HEPATOLOGY at [facility]. Please see a copy of my visit note below.    A/P  34 year old yo female with HBV. Has been on tenofovir  since 2011. Liver tests have been normal and HBV DNA has been undetectable.    Fibrosis scan 2018 F0-F1.  Continue daily tenofovir and every 6 months US and labs. RTC 1 year.    This was a 20 minute visit, over 50% counseling and coordination of care.     Samra Steinberg MD  Hepatology/Liver Transplant  Medical Director, Liver Transplantation  [facility]  ==================================================================  S:  34 year old you female with hepatitis B. She followed with me at  in the past. On Viread since about 2011. She takes it daily and has had a negative HBV DNA and normal liver tests for a number of years.    Fibrosis scan 2018 F0-F1.     She had a c/o easy bruising in the past and saw hematology. Testing did not show any abnormalities.  She said she still has this. Otherwise she said she is feeling well.      Hep B e Ag pos  Hep b e Ruby neg  HBV DNA pending  Liver tests pending  HIV neg 2015  HCV no record     CT 7/23/18 Liver normal. Done for pyelonephritis    Doing well. No problems getting medication on time and taking it every day. No new health problems. No new family history.    Lab Test 11/25/19  0843   PROTTOTAL 8.2   ALBUMIN 4.2   BILITOTAL 0.9   ALKPHOS 50   AST 22   ALT 29     CBC RESULTS:   Recent Labs   Lab Test 11/25/19  0843   WBC 4.5   RBC 4.76   HGB 14.3   HCT 44.7   MCV 94   MCH 30.0   MCHC 32.0   RDW 11.9        Soc: She works at [employer]. She has 6 children. Ages 8-167 No plans for any more kids.     O:  /85 (BP Location: Right arm, Patient Position: Sitting, Cuff Size: Adult Regular)   Pulse 52   Temp 97.7  F (36.5  C)    Wt 55 kg (121 lb 3.2 oz)   SpO2 100%   BMI 25.33 kg/m     Gen: No acute distress  Head: Normocephalic atraumatic  Eyes: Sclera anicteric  Extrem: No edema  Skin: No jaundice, spider angiomata or palmar erythema.  No rashes.   Neuro: Alert and oriented. No asterixis.    MSK: Grossly Intact  Psych: Normal speech, normal affect    Again, thank you for allowing me to participate in the care of your patient.      Sincerely,    Samra Steinberg MD

## 2019-11-26 LAB
HBV DNA SERPL NAA+PROBE-ACNC: NORMAL [IU]/ML
HBV DNA SERPL NAA+PROBE-LOG IU: NORMAL {LOG_IU}/ML

## 2020-02-16 ENCOUNTER — HEALTH MAINTENANCE LETTER (OUTPATIENT)
Age: 35
End: 2020-02-16

## 2020-11-18 ENCOUNTER — TELEPHONE (OUTPATIENT)
Dept: GASTROENTEROLOGY | Facility: CLINIC | Age: 35
End: 2020-11-18

## 2020-11-18 DIAGNOSIS — B18.1 CHRONIC VIRAL HEPATITIS B WITHOUT DELTA AGENT AND WITHOUT COMA (H): Primary | ICD-10-CM

## 2020-11-18 NOTE — TELEPHONE ENCOUNTER
Will fax to [facility] [phone]    Oneyda LARRY LPN  Hepatology Clinic     Health Call Center    Phone Message    May a detailed message be left on voicemail: yes     Reason for Call: Order(s): Other:   Reason for requested: Pt is requesting for their lab orders to be sent to the [facility]  Date needed: asap  Provider name: Dr. Steinberg      Action Taken: Message routed to:  [facility] hep    Travel Screening: Not Applicable

## 2020-11-20 ENCOUNTER — VIRTUAL VISIT (OUTPATIENT)
Dept: GASTROENTEROLOGY | Facility: CLINIC | Age: 35
End: 2020-11-20
Attending: INTERNAL MEDICINE
Payer: COMMERCIAL

## 2020-11-20 DIAGNOSIS — B18.1 CHRONIC VIRAL HEPATITIS B WITHOUT DELTA AGENT AND WITHOUT COMA (H): ICD-10-CM

## 2020-11-20 PROCEDURE — 99213 OFFICE O/P EST LOW 20 MIN: CPT | Mod: GT | Performed by: INTERNAL MEDICINE

## 2020-11-20 RX ORDER — TENOFOVIR DISOPROXIL FUMARATE 300 MG/1
300 TABLET, FILM COATED ORAL DAILY
Qty: 90 TABLET | Refills: 3 | Status: SHIPPED | OUTPATIENT
Start: 2020-11-20 | End: 2021-01-07

## 2020-11-20 ASSESSMENT — PAIN SCALES - GENERAL: PAINLEVEL: NO PAIN (0)

## 2020-11-20 NOTE — LETTER
Date:November 24, 2020      Patient was self referred, no letter generated. Do not send.        [facility] Health Information

## 2020-11-20 NOTE — PROGRESS NOTES
"Christie Phoenix is a 35 year old female who is being evaluated via a billable video visit.    The patient has been notified of following:   \"This video visit will be conducted via a call between you and your physician/provider. We have found that certain health care needs can be provided without the need for an in-person physical exam.  This service lets us provide the care you need with a video conversation.  If a prescription is necessary we can send it directly to your pharmacy.  If lab work is needed we can place an order for that and you can then stop by our lab to have the test done at a later time.  Video visits are billed at different rates depending on your insurance coverage.  Please reach out to your insurance provider with any questions.  If during the course of the call the physician/provider feels a video visit is not appropriate, you will not be charged for this service.\"  Patient has given verbal consent for Video visit? Yes  How would you like to obtain your AVS? MyChart  If you are dropped from the video visit, the video invite should be resent to: Text to cell phone: [phone] please send [employer] link  Will anyone else be joining your video visit? No      Video Start Time: 0900  Video End Time: 0916  Originating Location (pt. Location): Home  Distant Location (provider location):  [facility] HEPATOLOGY CLINIC [place]   Platform used for Video Visit: [id number]   ---------------------------------------------------------------------------------------------------------------------------------------------------------    HEPATOLOGY CLINIC VISIT  CC/REFERRING PROVIDER:  Referred Self  REASON FOR CONSULTATION: CHB  ASSESSMENT/PLAN:  35 year old female with chronic HBV on TDF, seen for follow up    1. Chronic HBV  Stable, with no new symptoms. Has been on TDF since 2011. Also compliant with TDF - no insurance or pharmacy issues. No cirrhosis (Fibrosis scan 2018 F0-F1), age <50 and no family history " of liver cancer, so no HCC screening at this time. Will add on HBV DNA to most recent labs    PLAN:  1. Continue TDF. Renewed  2. Add on HBV DNA  3. Labs every 6 months  4. Does not yet quality for HCC screening     Colorectal Cancer Screening  In 9 years    RTC 1 year, sooner if symptomatic.       The visit lasted up to 16 minutes, with more than half of the time spent on counseling and education.  All questions were answered to patient's satisfaction    Patient seen and discussed with staff GI physician, Dr. Steinberg, who agrees with my assessment and plan.      Ramona Jenkins MD  Transplant Hepatology fellow  PGY 6  [phone]   ATTENDING NOTE, GASTROENTEROLOGY/HEPATOLOGY    I saw and discussed this patient with the fellow and participated in the decision making. I agree with the fellow's note. Samra Steinberg MD    Hepatology/Liver Transplant  Medical Director, Liver Transplantation  [facility]    Appointments [phone]  Clinic Fax [phone]  Transplant Care [phone] Option 4  Transplant Fax [phone]  Administrative Office [phone]  Administrative Fax [phone]  ===================================================================      ---------------------------------------------------------------------------------------------------------------------------------------------------------------------------------------------    HPI: 35 year old female with chronic HBV on TDF, seen for follow up  This patient was last seen 11/25/2019    She has done well since she was last seen. She has no complains today. She is compliant with her medications and has not had any trouble obtaining them from the pharmacy. She has not had any confusion, lethargy, melena, hematochezia, hematemesis, abdominal or leg swelling. She has not had any fever or chills, no jaundice or itching.  She had a Fibrosis scan in 2018 with F0-F1 fibrosis. She denies alcohol  use, and she does not smoke.  She has no family history of liver cancer    ROS: 10pt ROS performed and otherwise negative.    PERTINENT PAST MEDICAL/SURGICAL HISTORY:  Past Medical History:   Diagnosis Date     Hepatitis B carrier (H)       PERTINENT MEDICATIONS:  Current Outpatient Medications:      clobetasol (TEMOVATE) 0.05 % ointment, At bedtime as needed, Disp: , Rfl:      tenofovir (VIREAD) 300 MG tablet, Take 1 tablet (300 mg) by mouth daily 1 tab daily, Disp: 90 tablet, Rfl: 3    PHYSICAL EXAMINATION:  Gen: aaox3, cooperative, pleasant,  HEENT: ncat,   Resp/CV without acute findings, not dyspneic  Skin: no jaundice  Neuro: grossly intact,    PERTINENT STUDIES:  Hgb 13.5,   AST 19, ALT 19, ALP 46, TB 0.9  INR 1.00    Ramona Jenkins MD

## 2020-11-20 NOTE — LETTER
"    11/20/2020         RE: Christie Phoenix  [address] [facility] [address]        Dear Colleague,    Thank you for referring your patient, Christie Phoenix, to the [facility]. Please see a copy of my visit note below.    Christie Phoenix is a 35 year old female who is being evaluated via a billable video visit.    The patient has been notified of following:   \"This video visit will be conducted via a call between you and your physician/provider. We have found that certain health care needs can be provided without the need for an in-person physical exam.  This service lets us provide the care you need with a video conversation.  If a prescription is necessary we can send it directly to your pharmacy.  If lab work is needed we can place an order for that and you can then stop by our lab to have the test done at a later time.  Video visits are billed at different rates depending on your insurance coverage.  Please reach out to your insurance provider with any questions.  If during the course of the call the physician/provider feels a video visit is not appropriate, you will not be charged for this service.\"  Patient has given verbal consent for Video visit? Yes  How would you like to obtain your AVS? MyChart  If you are dropped from the video visit, the video invite should be resent to: Text to cell phone: [phone] please send Adiana link  Will anyone else be joining your video visit? No      Video Start Time: 0900  Video End Time: 0916  Originating Location (pt. Location): Home  Distant Location (provider location):  [facility]   Platform used for Video Visit: DoximViaBill   ---------------------------------------------------------------------------------------------------------------------------------------------------------    HEPATOLOGY CLINIC VISIT  CC/REFERRING PROVIDER:  Referred Self  REASON FOR CONSULTATION: CHB  ASSESSMENT/PLAN:  35 year old " female with chronic HBV on TDF, seen for follow up    1. Chronic HBV  Stable, with no new symptoms. Has been on TDF since 2011. Also compliant with TDF - no insurance or pharmacy issues. No cirrhosis (Fibrosis scan 2018 F0-F1), age <50 and no family history of liver cancer, so no HCC screening at this time. Will add on HBV DNA to most recent labs    PLAN:  1. Continue TDF. Renewed  2. Add on HBV DNA  3. Labs every 6 months  4. Does not yet quality for HCC screening     Colorectal Cancer Screening  In 9 years    RTC 1 year, sooner if symptomatic.       The visit lasted up to 16 minutes, with more than half of the time spent on counseling and education.  All questions were answered to patient's satisfaction    Patient seen and discussed with staff GI physician, Dr. Steinberg, who agrees with my assessment and plan.      Ramona Jenkins MD  Transplant Hepatology fellow  PGY 6  [phone]   ATTENDING NOTE, GASTROENTEROLOGY/HEPATOLOGY    I saw and discussed this patient with the fellow and participated in the decision making. I agree with the fellow's note. Samra Steinberg MD    Hepatology/Liver Transplant  Medical Director, Liver Transplantation  [facility]    Appointments [phone]  Clinic Fax [phone]  Transplant Care [phone] Option 4  Transplant Fax [phone]  Administrative Office [phone]  Administrative Fax [phone]  ===================================================================      ---------------------------------------------------------------------------------------------------------------------------------------------------------------------------------------------    HPI: 35 year old female with chronic HBV on TDF, seen for follow up  This patient was last seen 11/25/2019    She has done well since she was last seen. She has no complains today. She is compliant with her medications and has not had any trouble obtaining them  from the pharmacy. She has not had any confusion, lethargy, melena, hematochezia, hematemesis, abdominal or leg swelling. She has not had any fever or chills, no jaundice or itching.  She had a Fibrosis scan in 2018 with F0-F1 fibrosis. She denies alcohol use, and she does not smoke.  She has no family history of liver cancer    ROS: 10pt ROS performed and otherwise negative.    PERTINENT PAST MEDICAL/SURGICAL HISTORY:  Past Medical History:   Diagnosis Date     Hepatitis B carrier (H)       PERTINENT MEDICATIONS:  Current Outpatient Medications:      clobetasol (TEMOVATE) 0.05 % ointment, At bedtime as needed, Disp: , Rfl:      tenofovir (VIREAD) 300 MG tablet, Take 1 tablet (300 mg) by mouth daily 1 tab daily, Disp: 90 tablet, Rfl: 3    PHYSICAL EXAMINATION:  Gen: aaox3, cooperative, pleasant,  HEENT: ncat,   Resp/CV without acute findings, not dyspneic  Skin: no jaundice  Neuro: grossly intact,    PERTINENT STUDIES:  Hgb 13.5,   AST 19, ALT 19, ALP 46, TB 0.9  INR 1.00    Ramona Jenkins MD            Again, thank you for allowing me to participate in the care of your patient.        Sincerely,        Samra Steinberg MD

## 2020-11-29 ENCOUNTER — HEALTH MAINTENANCE LETTER (OUTPATIENT)
Age: 35
End: 2020-11-29

## 2021-01-06 DIAGNOSIS — B18.1 CHRONIC VIRAL HEPATITIS B WITHOUT DELTA AGENT AND WITHOUT COMA (H): ICD-10-CM

## 2021-01-07 RX ORDER — TENOFOVIR DISOPROXIL FUMARATE 300 MG/1
TABLET, FILM COATED ORAL
Qty: 90 TABLET | Refills: 3 | Status: SHIPPED | OUTPATIENT
Start: 2021-01-07 | End: 2021-12-22

## 2021-04-10 ENCOUNTER — HEALTH MAINTENANCE LETTER (OUTPATIENT)
Age: 36
End: 2021-04-10

## 2021-09-19 ENCOUNTER — HEALTH MAINTENANCE LETTER (OUTPATIENT)
Age: 36
End: 2021-09-19

## 2021-12-22 DIAGNOSIS — B18.1 CHRONIC VIRAL HEPATITIS B WITHOUT DELTA AGENT AND WITHOUT COMA (H): ICD-10-CM

## 2021-12-22 RX ORDER — TENOFOVIR DISOPROXIL FUMARATE 300 MG/1
TABLET, FILM COATED ORAL
Qty: 90 TABLET | Refills: 3 | Status: SHIPPED | OUTPATIENT
Start: 2021-12-22 | End: 2022-12-02

## 2022-05-01 ENCOUNTER — HEALTH MAINTENANCE LETTER (OUTPATIENT)
Age: 37
End: 2022-05-01

## 2022-11-21 ENCOUNTER — HEALTH MAINTENANCE LETTER (OUTPATIENT)
Age: 37
End: 2022-11-21

## 2022-12-02 ENCOUNTER — TELEPHONE (OUTPATIENT)
Dept: GASTROENTEROLOGY | Facility: CLINIC | Age: 37
End: 2022-12-02

## 2022-12-02 DIAGNOSIS — B18.1 CHRONIC VIRAL HEPATITIS B WITHOUT DELTA AGENT AND WITHOUT COMA (H): ICD-10-CM

## 2022-12-02 DIAGNOSIS — B18.1 CHRONIC VIRAL HEPATITIS B WITHOUT DELTA AGENT AND WITHOUT COMA (H): Primary | ICD-10-CM

## 2022-12-02 RX ORDER — TENOFOVIR DISOPROXIL FUMARATE 300 MG/1
300 TABLET, FILM COATED ORAL DAILY
Qty: 90 TABLET | Refills: 3 | Status: SHIPPED | OUTPATIENT
Start: 2022-12-02 | End: 2023-02-13

## 2022-12-02 NOTE — TELEPHONE ENCOUNTER
Pre-visit labs ordered and faxed to [facility] lab [phone].    Jeannine Escalona, RN Care Coordinator  [facility]  Hepatology Clinic/Specialty Program

## 2022-12-02 NOTE — TELEPHONE ENCOUNTER
[facility] Call Center    Phone Message    May a detailed message be left on voicemail: yes     Reason for Call: Other: Pt scheduled w/ Dr Steinberg on 2/13/23 at 11 AM for a video appt. Pt requested lab orders be sent to [facility] prior to her appt w/ Dr Steinberg. [facility]'s lab fax # is [phone].     Action Taken: Other: hepa    Travel Screening: Not Applicable

## 2022-12-02 NOTE — TELEPHONE ENCOUNTER
[facility] Call Center    Phone Message    May a detailed message be left on voicemail: yes     Reason for Call: Medication Refill Request    Has the patient contacted the pharmacy for the refill? Yes   Name of medication being requested: tenofovir (VIREAD) 300 MG tablet  Provider who prescribed the medication: Dr Samra Steinberg  Pharmacy: [facility] - [address]  Date medication is needed: Before the end of this month. Pt only has enough prescriptions for this month.         Action Taken: Other: hepa    Travel Screening: Not Applicable

## 2022-12-02 NOTE — TELEPHONE ENCOUNTER
Refills sent to requested pharmacy    Jeannine Escalona, RN Care Coordinator  [facility]  Hepatology Clinic/Specialty Program

## 2023-02-13 ENCOUNTER — VIRTUAL VISIT (OUTPATIENT)
Dept: GASTROENTEROLOGY | Facility: CLINIC | Age: 38
End: 2023-02-13
Attending: INTERNAL MEDICINE
Payer: COMMERCIAL

## 2023-02-13 DIAGNOSIS — B18.1 CHRONIC VIRAL HEPATITIS B WITHOUT DELTA AGENT AND WITHOUT COMA (H): ICD-10-CM

## 2023-02-13 PROCEDURE — 99214 OFFICE O/P EST MOD 30 MIN: CPT | Mod: VID | Performed by: INTERNAL MEDICINE

## 2023-02-13 RX ORDER — TENOFOVIR DISOPROXIL FUMARATE 300 MG/1
300 TABLET, FILM COATED ORAL DAILY
Qty: 90 TABLET | Refills: 3 | Status: SHIPPED | OUTPATIENT
Start: 2023-02-13 | End: 2024-01-15

## 2023-02-13 ASSESSMENT — PAIN SCALES - GENERAL: PAINLEVEL: NO PAIN (0)

## 2023-02-13 NOTE — PROGRESS NOTES
[facility]  LIVER CLINIC FOLLOW UP  VIDEO VISIT    ASSESSMENT/PLAN:  Ms. Soriano is a 37 year old female with chronic HBV on TDF.    Chronic HBV Stable, with no new symptoms. Has been on TDF since 2011. Also compliant with TDF - no insurance or pharmacy issues. No cirrhosis (Fibrosis scan 2018 F0-F1), age <50 and no family history of liver cancer, so no HCC screening at this time.   No HBV DNA done recently. She will go to get this done.     PLAN:  1. Continue TDF. Renewed  2. Labs every 6 months  3. Does not yet qualify for HCC screening  4. US elastography    Colorectal Cancer Screening Due at age 44    RTC 1 year, sooner if symptomatic.         Hepatology/Liver Transplant  Medical Director, Liver Transplantation  [facility]    Appointments [phone]  Clinic Fax [phone]  Transplant Care [phone] Option 4  Transplant Fax [phone]  Administrative Office [phone]  Administrative Fax [phone]  ===================================================================      ---------------------------------------------------------------------------------------------------------------------------------------------------------------------------------------------    HPI: Ms. Phoenix is a 37 Y F  with chronic HBV on TDF, seen for follow up. LV This patient was last seen 11/20/20    She has done well since she was last seen. She has no complains today. She is compliant with her medications and has not had any trouble obtaining them from the pharmacy. She has not had any confusion, lethargy, melena, hematochezia, hematemesis, abdominal or leg swelling. She has not had any fever or chills, no jaundice or itching.  She had a fibrosis scan in 2018 with F0-F1 fibrosis. She denies alcohol use, and she does not smoke.  She has no family history of liver cancer    Labs 1/25/23  Hgb 13.3,  WBC 5.7  AST 20, ALT 19, ALP 48, TB 0.8  INR 1.0  BMP nl.  HBV DNA  Not  done    Exam  Gen Alert pleasant NAD  Resp No difficulty breathing. No cough  Skin No Jaundice  Eyes No icterus  Neuro KOEHLER  MSK no muscle wasting  Psyche Pleasant, appropriate. Well groomed.  Christie Phoenix is a 37 year old female who is being evaluated via a billable video visit.    Video-Visit Details  Type of service:  Video Visit  Video Start Time:1100  Video End Time:1116  Originating Location (pt. Location):home  Distant Location (provider location):  [facility] HEPATOLOGY CLINIC [place]      Platform used for Video Visit: Meilele or Interhyp

## 2023-02-13 NOTE — LETTER
Date:February 15, 2023      Patient was self referred, no letter generated. Do not send.        [facility] Health Information

## 2023-02-13 NOTE — LETTER
2/13/2023         RE: Christie Phoenix  [address] [facility] [address]        Dear Colleague,    Thank you for referring your patient, Christie Phoenix, to the [facility] HEPATOLOGY CLINIC [place]. Please see a copy of my visit note below.      [facility]  LIVER CLINIC FOLLOW UP  VIDEO VISIT    ASSESSMENT/PLAN:  Ms. Soriano is a 37 year old female with chronic HBV on TDF.    Chronic HBV Stable, with no new symptoms. Has been on TDF since 2011. Also compliant with TDF - no insurance or pharmacy issues. No cirrhosis (Fibrosis scan 2018 F0-F1), age <50 and no family history of liver cancer, so no HCC screening at this time.   No HBV DNA done recently. She will go to get this done.     PLAN:  1. Continue TDF. Renewed  2. Labs every 6 months  3. Does not yet qualify for HCC screening  4. US elastography    Colorectal Cancer Screening Due at age 44    RTC 1 year, sooner if symptomatic.         Hepatology/Liver Transplant  Medical Director, Liver Transplantation  [facility]    Appointments [phone]  Clinic Fax [phone]  Transplant Care [phone] Option 4  Transplant Fax [phone]  Administrative Office [phone]  Administrative Fax [phone]  ===================================================================      ---------------------------------------------------------------------------------------------------------------------------------------------------------------------------------------------    HPI: Ms. Phoenix is a 37 Y F  with chronic HBV on TDF, seen for follow up. LV This patient was last seen 11/20/20    She has done well since she was last seen. She has no complains today. She is compliant with her medications and has not had any trouble obtaining them from the pharmacy. She has not had any confusion, lethargy, melena, hematochezia, hematemesis, abdominal or leg swelling. She has not had any fever or chills, no jaundice or itching.  She had a  fibrosis scan in 2018 with F0-F1 fibrosis. She denies alcohol use, and she does not smoke.  She has no family history of liver cancer    Labs 1/25/23  Hgb 13.3,  WBC 5.7  AST 20, ALT 19, ALP 48, TB 0.8  INR 1.0  BMP nl.  HBV DNA  Not done    Exam  Gen Alert pleasant NAD  Resp No difficulty breathing. No cough  Skin No Jaundice  Eyes No icterus  Neuro KOEHLER  MSK no muscle wasting  Psyche Pleasant, appropriate. Well groomed.  Christie Phoenix is a 37 year old female who is being evaluated via a billable video visit.    Video-Visit Details  Type of service:  Video Visit  Video Start Time:1100  Video End Time:1116  Originating Location (pt. Location):home  Distant Location (provider location):  [facility] HEPATOLOGY CLINIC [place]      Platform used for Video Visit: TidePool or ConjuGon                Again, thank you for allowing me to participate in the care of your patient.        Sincerely,        Samra Steinberg MD

## 2023-02-13 NOTE — PROGRESS NOTES
"Video-Visit Details    Type of service:  Video Visit    Video Start Time (time video started): ***    Video End Time (time video stopped): ***    Originating Location (pt. Location): {video visit patient location:936517::\"Home\"}    {PROVIDER LOCATION On-site should be selected for visits conducted from your clinic location or adjoining [facility] hospital, academic office, or other nearby [facility] building. Off-site should be selected for all other provider locations, including home:879056}    Distant Location (provider location):  {virtual location provider:979576}    Mode of Communication:  Video Conference via Voluntis        "

## 2023-02-13 NOTE — NURSING NOTE
Is the patient currently in the state of [place]? YES    Visit mode:VIDEO    If the visit is dropped, the patient can be reconnected by: VIDEO VISIT: Text to cell phone: [phone]    Will anyone else be joining the visit? NO      How would you like to obtain your AVS? MyChart    Are changes needed to the allergy or medication list? YES: PT states is no longer using clobetasol.    Comments or concerns regarding today's visit: Follow up    Natasha Mendieta

## 2023-06-02 ENCOUNTER — HEALTH MAINTENANCE LETTER (OUTPATIENT)
Age: 38
End: 2023-06-02

## 2024-01-14 DIAGNOSIS — B18.1 CHRONIC VIRAL HEPATITIS B WITHOUT DELTA AGENT AND WITHOUT COMA (H): ICD-10-CM

## 2024-01-15 RX ORDER — TENOFOVIR DISOPROXIL FUMARATE 300 MG/1
300 TABLET, FILM COATED ORAL DAILY
Qty: 90 TABLET | Refills: 0 | Status: SHIPPED | OUTPATIENT
Start: 2024-01-15 | End: 2024-01-22

## 2024-01-17 ENCOUNTER — LAB (OUTPATIENT)
Dept: LAB | Facility: CLINIC | Age: 39
End: 2024-01-17
Payer: COMMERCIAL

## 2024-01-17 DIAGNOSIS — B18.1 CHRONIC VIRAL HEPATITIS B WITHOUT DELTA AGENT AND WITHOUT COMA (H): ICD-10-CM

## 2024-01-17 LAB
ERYTHROCYTE [DISTWIDTH] IN BLOOD BY AUTOMATED COUNT: 12.1 % (ref 10–15)
HCT VFR BLD AUTO: 43.3 % (ref 35–47)
HGB BLD-MCNC: 13.8 G/DL (ref 11.7–15.7)
INR PPP: 1.09 (ref 0.85–1.15)
MCH RBC QN AUTO: 29.9 PG (ref 26.5–33)
MCHC RBC AUTO-ENTMCNC: 31.9 G/DL (ref 31.5–36.5)
MCV RBC AUTO: 94 FL (ref 78–100)
PLATELET # BLD AUTO: 255 10E3/UL (ref 150–450)
RBC # BLD AUTO: 4.61 10E6/UL (ref 3.8–5.2)
WBC # BLD AUTO: 4.8 10E3/UL (ref 4–11)

## 2024-01-17 PROCEDURE — 85027 COMPLETE CBC AUTOMATED: CPT

## 2024-01-17 PROCEDURE — 87517 HEPATITIS B DNA QUANT: CPT

## 2024-01-17 PROCEDURE — 80053 COMPREHEN METABOLIC PANEL: CPT

## 2024-01-17 PROCEDURE — 82248 BILIRUBIN DIRECT: CPT

## 2024-01-17 PROCEDURE — 85610 PROTHROMBIN TIME: CPT

## 2024-01-17 PROCEDURE — 36415 COLL VENOUS BLD VENIPUNCTURE: CPT

## 2024-01-18 LAB — HBV DNA SERPL NAA+PROBE-ACNC: NOT DETECTED IU/ML

## 2024-01-19 LAB
ALBUMIN SERPL BCG-MCNC: 4.3 G/DL (ref 3.5–5.2)
ALP SERPL-CCNC: 54 U/L (ref 40–150)
ALT SERPL W P-5'-P-CCNC: 18 U/L (ref 0–50)
ANION GAP SERPL CALCULATED.3IONS-SCNC: 7 MMOL/L (ref 7–15)
AST SERPL W P-5'-P-CCNC: 22 U/L (ref 0–45)
BILIRUB DIRECT SERPL-MCNC: <0.2 MG/DL (ref 0–0.3)
BILIRUB SERPL-MCNC: 0.7 MG/DL
BUN SERPL-MCNC: 9.7 MG/DL (ref 6–20)
CALCIUM SERPL-MCNC: 9 MG/DL (ref 8.6–10)
CHLORIDE SERPL-SCNC: 106 MMOL/L (ref 98–107)
CREAT SERPL-MCNC: 0.78 MG/DL (ref 0.51–0.95)
DEPRECATED HCO3 PLAS-SCNC: 27 MMOL/L (ref 22–29)
EGFRCR SERPLBLD CKD-EPI 2021: >90 ML/MIN/1.73M2
GLUCOSE SERPL-MCNC: 76 MG/DL (ref 70–99)
POTASSIUM SERPL-SCNC: 4.8 MMOL/L (ref 3.4–5.3)
PROT SERPL-MCNC: 7.2 G/DL (ref 6.4–8.3)
SODIUM SERPL-SCNC: 140 MMOL/L (ref 135–145)

## 2024-01-22 ENCOUNTER — VIRTUAL VISIT (OUTPATIENT)
Dept: GASTROENTEROLOGY | Facility: CLINIC | Age: 39
End: 2024-01-22
Attending: INTERNAL MEDICINE
Payer: COMMERCIAL

## 2024-01-22 VITALS — BODY MASS INDEX: 26.54 KG/M2 | WEIGHT: 127 LBS

## 2024-01-22 DIAGNOSIS — B18.1 CHRONIC VIRAL HEPATITIS B WITHOUT DELTA AGENT AND WITHOUT COMA (H): ICD-10-CM

## 2024-01-22 DIAGNOSIS — B16.1 VIRAL HEPATITIS B WITH HEPATITIS DELTA, WITHOUT HEPATIC COMA, UNSPECIFIED CHRONICITY: Primary | ICD-10-CM

## 2024-01-22 PROCEDURE — 99214 OFFICE O/P EST MOD 30 MIN: CPT | Mod: 95 | Performed by: INTERNAL MEDICINE

## 2024-01-22 RX ORDER — TENOFOVIR DISOPROXIL FUMARATE 300 MG/1
300 TABLET, FILM COATED ORAL DAILY
Qty: 90 TABLET | Refills: 3 | Status: SHIPPED | OUTPATIENT
Start: 2024-01-22

## 2024-01-22 ASSESSMENT — PAIN SCALES - GENERAL: PAINLEVEL: NO PAIN (0)

## 2024-01-22 NOTE — NURSING NOTE
Is the patient currently in the state of [place]? YES    Visit mode:VIDEO    If the visit is dropped, the patient can be reconnected by: VIDEO VISIT: Text to cell phone:   Telephone Information:   Mobile [phone]       Will anyone else be joining the visit? NO  (If patient encounters technical issues they should call [phone] :[phone])    How would you like to obtain your AVS? MyChart    Are changes needed to the allergy or medication list? No    Reason for visit: Video Visit (Recheck)    Brianne RAMESH

## 2024-01-22 NOTE — LETTER
1/22/2024         RE: Christie Phoenix  [address] [facility] [address]        Dear Colleague,    Thank you for referring your patient, Christie Phoenix, to the [facility] HEPATOLOGY CLINIC [place]. Please see a copy of my visit note below.      [facility]  LIVER CLINIC FOLLOW UP  VIDEO VISIT    ASSESSMENT/PLAN:  Ms. Soriano is a 38 Y F  with chronic HBV on TDF.    Chronic HBV Stable, with no new symptoms. Has been on TDF since 2011, no insurance or pharmacy issues. Fibrosis scan 2018 F0-F1 and repeat 11/2023 F0-F1, age <50 and no family history of liver cancer, so no HCC screening at this time.   No HBV DNA done recently. She will go to get this done.     PLAN:  1. Continue TDF. Renewed  2. Labs every 6 months  3. Does not yet qualify for HCC screening    Colorectal Cancer Screening Due at age 44    RTC 1 year, sooner if symptomatic.       Samra Steinberg MD    Hepatology/Liver Transplant  Medical Director, Liver Transplantation  [facility]    Appointments [phone]  Clinic Fax [phone]  Transplant Care [phone] Option 4  Transplant Fax [phone]  Administrative Office [phone]  Administrative Fax [phone]  ===================================================================    HPI: Ms. Phoenix is a 37 Y F  with chronic HBV on TDF, seen for follow up.  She is doing well.     She has no complains today. She is compliant with her medications and has not had any trouble obtaining them from the pharmacy. She did run out without refills and has been without for about a week.  She had a fibrosis scan in 2018 with F0-F1 fibrosis. Repeat 11/2023 showed the same. Recent fibrosis scan also included standard US which was normal.    No alcohol or tobacco use  She has no family history of liver cancer    Liver Function Studies - Recent Labs   Lab Test 01/17/24  1213   PROTTOTAL 7.2   ALBUMIN 4.3   BILITOTAL 0.7   ALKPHOS 54   AST 22   ALT 18     CBC RESULTS:  Recent Labs   Lab Test 01/17/24  1213   WBC 4.8   RBC 4.61   HGB 13.8   HCT 43.3   MCV 94   MCH 29.9   MCHC 31.9   RDW 12.1        HBV DNA 1/17/24 not detected    Labs 1/25/23  Hgb 13.3,  WBC 5.7  AST 20, ALT 19, ALP 48, TB 0.8  INR 1.0  BMP nl.  HBV DNA  Not done    Exam  Gen Alert pleasant NAD  Resp No difficulty breathing. No cough  Skin No Jaundice  Eyes No icterus  Neuro KOEHLER  MSK no muscle wasting  Psyche Pleasant, appropriate. Well groomed.  Christie Phoenix is a 37 year old female who is being evaluated via a billable video visit.    Video-Visit Details  Type of service:  Video Visit  Video Start Time:1105  Video End Time:1115  Originating Location (pt. Location):home  Distant Location (provider location):  [facility] HEPATOLOGY CLINIC [place]      Platform used for Video Visit: [email or web address] or rimidi                Again, thank you for allowing me to participate in the care of your patient.        Sincerely,        Samra Steinberg MD

## 2024-01-22 NOTE — PROGRESS NOTES
"Virtual Visit Details    Type of service:  Video Visit     Originating Location (pt. Location): {video visit patient location:441694::\"Home\"}  {PROVIDER LOCATION On-site should be selected for visits conducted from your clinic location or adjoining [facility] hospital, academic office, or other nearby [facility] building. Off-site should be selected for all other provider locations, including home:599306}  Distant Location (provider location):  {virtual location provider:504952}  Platform used for Video Visit: {Virtual Visit Platforms:025870::\"[employer]\"}  "

## 2024-01-22 NOTE — PROGRESS NOTES
[facility]  LIVER CLINIC FOLLOW UP  VIDEO VISIT    ASSESSMENT/PLAN:  Ms. Soriano is a 38 Y F  with chronic HBV on TDF.    Chronic HBV Stable, with no new symptoms. Has been on TDF since 2011, no insurance or pharmacy issues. Fibrosis scan 2018 F0-F1 and repeat 11/2023 F0-F1, age <50 and no family history of liver cancer, so no HCC screening at this time.   No HBV DNA done recently. She will go to get this done.     PLAN:  1. Continue TDF. Renewed  2. Labs every 6 months  3. Does not yet qualify for HCC screening    Colorectal Cancer Screening Due at age 44    RTC 1 year, sooner if symptomatic.       Samra Steinberg MD    Hepatology/Liver Transplant  Medical Director, Liver Transplantation  [facility]    Appointments [phone]  Clinic Fax [phone]  Transplant Care [phone] Option 4  Transplant Fax [phone]  Administrative Office [phone]  Administrative Fax [phone]  ===================================================================    HPI: Ms. Phoenix is a 37 Y F  with chronic HBV on TDF, seen for follow up.  She is doing well.     She has no complains today. She is compliant with her medications and has not had any trouble obtaining them from the pharmacy. She did run out without refills and has been without for about a week.  She had a fibrosis scan in 2018 with F0-F1 fibrosis. Repeat 11/2023 showed the same. Recent fibrosis scan also included standard US which was normal.    No alcohol or tobacco use  She has no family history of liver cancer    Liver Function Studies - Recent Labs   Lab Test 01/17/24  1213   PROTTOTAL 7.2   ALBUMIN 4.3   BILITOTAL 0.7   ALKPHOS 54   AST 22   ALT 18     CBC RESULTS: Recent Labs   Lab Test 01/17/24  1213   WBC 4.8   RBC 4.61   HGB 13.8   HCT 43.3   MCV 94   MCH 29.9   MCHC 31.9   RDW 12.1        HBV DNA 1/17/24 not detected    Labs 1/25/23  Hgb 13.3,  WBC 5.7  AST 20, ALT 19, ALP 48, TB 0.8  INR 1.0  BMP  nl.  HBV DNA  Not done    Exam  Gen Alert pleasant NAD  Resp No difficulty breathing. No cough  Skin No Jaundice  Eyes No icterus  Neuro KOEHLER  MSK no muscle wasting  Psyche Pleasant, appropriate. Well groomed.  Christie Phoenix is a 37 year old female who is being evaluated via a billable video visit.    Video-Visit Details  Type of service:  Video Visit  Video Start Time:1105  Video End Time:1115  Originating Location (pt. Location):home  Distant Location (provider location):  [facility] HEPATOLOGY CLINIC [place]      Platform used for Video Visit: Rain or Deep Nines

## 2024-06-23 ENCOUNTER — HEALTH MAINTENANCE LETTER (OUTPATIENT)
Age: 39
End: 2024-06-23

## 2025-01-04 ENCOUNTER — HEALTH MAINTENANCE LETTER (OUTPATIENT)
Age: 40
End: 2025-01-04

## 2025-01-16 ENCOUNTER — TELEPHONE (OUTPATIENT)
Dept: GASTROENTEROLOGY | Facility: CLINIC | Age: 40
End: 2025-01-16
Payer: COMMERCIAL

## 2025-01-16 ENCOUNTER — LAB (OUTPATIENT)
Dept: LAB | Facility: CLINIC | Age: 40
End: 2025-01-16
Payer: COMMERCIAL

## 2025-01-16 DIAGNOSIS — B16.1 VIRAL HEPATITIS B WITH HEPATITIS DELTA, WITHOUT HEPATIC COMA, UNSPECIFIED CHRONICITY: ICD-10-CM

## 2025-01-16 DIAGNOSIS — B18.1 CHRONIC VIRAL HEPATITIS B WITHOUT DELTA AGENT AND WITHOUT COMA (H): ICD-10-CM

## 2025-01-16 LAB
ERYTHROCYTE [DISTWIDTH] IN BLOOD BY AUTOMATED COUNT: 12.1 % (ref 10–15)
HCT VFR BLD AUTO: 42.4 % (ref 35–47)
HGB BLD-MCNC: 14.1 G/DL (ref 11.7–15.7)
MCH RBC QN AUTO: 30.7 PG (ref 26.5–33)
MCHC RBC AUTO-ENTMCNC: 33.3 G/DL (ref 31.5–36.5)
MCV RBC AUTO: 92 FL (ref 78–100)
PLATELET # BLD AUTO: 257 10E3/UL (ref 150–450)
RBC # BLD AUTO: 4.59 10E6/UL (ref 3.8–5.2)
WBC # BLD AUTO: 7.2 10E3/UL (ref 4–11)

## 2025-01-16 NOTE — TELEPHONE ENCOUNTER
Was the patient contacted by phone and reminded of the upcoming visit? Confirmed video visit.     Were ordered labs and tests completed prior to the appointment? Reminded that she is due for labs, she states she will go to her local [place] by next Friday to get drawn.     Were the needed  orders placed? Yes    Maria E Lynch, [facility]  1/16/2025 9:27 AM

## 2025-01-18 LAB — HBV DNA SERPL NAA+PROBE-ACNC: NOT DETECTED IU/ML

## 2025-01-19 DIAGNOSIS — B18.1 CHRONIC VIRAL HEPATITIS B WITHOUT DELTA AGENT AND WITHOUT COMA (H): ICD-10-CM

## 2025-01-20 RX ORDER — TENOFOVIR DISOPROXIL FUMARATE 300 MG/1
300 TABLET, FILM COATED ORAL DAILY
Qty: 90 TABLET | Refills: 0 | Status: SHIPPED | OUTPATIENT
Start: 2025-01-20

## 2025-01-27 ENCOUNTER — VIRTUAL VISIT (OUTPATIENT)
Dept: GASTROENTEROLOGY | Facility: CLINIC | Age: 40
End: 2025-01-27
Payer: COMMERCIAL

## 2025-01-27 DIAGNOSIS — B18.1 CHRONIC VIRAL HEPATITIS B WITHOUT DELTA AGENT AND WITHOUT COMA (H): ICD-10-CM

## 2025-01-27 PROCEDURE — G2211 COMPLEX E/M VISIT ADD ON: HCPCS | Performed by: INTERNAL MEDICINE

## 2025-01-27 PROCEDURE — 98006 SYNCH AUDIO-VIDEO EST MOD 30: CPT | Performed by: INTERNAL MEDICINE

## 2025-01-27 RX ORDER — TENOFOVIR DISOPROXIL FUMARATE 300 MG/1
300 TABLET, FILM COATED ORAL DAILY
Qty: 90 TABLET | Refills: 3 | Status: SHIPPED | OUTPATIENT
Start: 2025-01-27

## 2025-01-27 NOTE — PROGRESS NOTES
[facility]  LIVER CLINIC FOLLOW UP  VIDEO VISIT    ASSESSMENT/PLAN:  Ms. Soriano is a 39 Y F with chronic HBV controlled on TDF.    Chronic HBV Stable, with no new symptoms. Has been on TDF since 2011, no insurance or pharmacy issues. Fibrosis scan 2018 F0-F1 and repeat 11/2023 F0-F1, age <50 and no family history of liver cancer, so no HCC screening at this time.    Colorectal Cancer Screening Due at age 44  PLAN:  1. Continue TDF. Renewed  2. Labs every 6 months, ordered    Return to clinic in 12 months. In person or via video. I let patient know if they would like to see me in person and they are told there are no appointments available or that we are booking out too far, they should send me a message and I will always find a time to see the patient in person if preferred/desired/needed.      Samra Steinberg MD    Hepatology/Liver Transplant  Medical Director, Liver Transplantation  [facility]    Appointments [phone]  Clinic Fax [phone]  Transplant Care [phone] Option 4  Transplant Fax [phone]  Administrative Office [phone]  Administrative Fax [phone]  ===================================================================    HPI: Ms. Phoenix is a 39 Y F  with chronic HBV on TDF, seen for follow up.  She is doing well.     She has no complains today. She is compliant with her medications and has not had any trouble obtaining them from the pharmacy. She has not been without medication.    Fibrosis scan in 2018 with F0-F1 fibrosis.   US elastography 11/2023 showed the same. Included standard US which was normal.    No alcohol or tobacco use  She has no family history of liver cancer    HBV DNA 1/16/25 not detected  Lab Test 01/16/25  1821   PROTTOTAL 7.4   ALBUMIN 4.4   BILITOTAL 0.6   ALKPHOS 52   AST 23   ALT 20     Lab Test 01/16/25  1821   WBC 7.2   RBC 4.59   HGB 14.1   HCT 42.4   MCV 92   MCH 30.7   MCHC 33.3   RDW 12.1        MELD 3.0: 7  at 1/16/2025  6:21 PM  MELD-Na: 6 at 1/16/2025  6:21 PM  Calculated from:  Serum Creatinine: 0.72 mg/dL (Using min of 1 mg/dL) at 1/16/2025  6:21 PM  Serum Sodium: 138 mmol/L (Using max of 137 mmol/L) at 1/16/2025  6:21 PM  Total Bilirubin: 0.6 mg/dL (Using min of 1 mg/dL) at 1/16/2025  6:21 PM  Serum Albumin: 4.4 g/dL (Using max of 3.5 g/dL) at 1/16/2025  6:21 PM  INR(ratio): 0.97 (Using min of 1) at 1/16/2025  6:21 PM  Age at listing (hypothetical): 39 years  Sex: Female at 1/16/2025  6:21 PM      Liver Function Studies - Recent Labs   Lab Test 01/17/24  1213   PROTTOTAL 7.2   ALBUMIN 4.3   BILITOTAL 0.7   ALKPHOS 54   AST 22   ALT 18     CBC RESULTS: Recent Labs   Lab Test 01/17/24  1213   WBC 4.8   RBC 4.61   HGB 13.8   HCT 43.3   MCV 94   MCH 29.9   MCHC 31.9   RDW 12.1        HBV DNA 1/17/24 not detected    Labs 1/25/23  Hgb 13.3,  WBC 5.7  AST 20, ALT 19, ALP 48, TB 0.8  INR 1.0  BMP nl.  HBV DNA  Not done    Exam  Gen Alert pleasant NAD  Resp No difficulty breathing. No cough  Skin No Jaundice  Eyes No icterus  Neuro KOEHLER  MSK no muscle wasting  Psyche Pleasant, appropriate. Well groomed.  Christie Phoenix is a 39 year old female who is being evaluated via a billable video visit.    Video-Visit Details  Type of service:  Video Visit  Video Start Time:839  Video End Time:849  Originating Location (pt. Location):home  Distant Location (provider location):  [facility] [place]      Platform used for Video Visit: [employer] or [employer]        The longitudinal plan of care for the diagnosis(es)/condition(s) as documented were addressed during this visit. Due to the added complexity in care, I will continue to support Neelam in the subsequent management and with ongoing continuity of care.

## 2025-01-27 NOTE — NURSING NOTE
Current patient location:  [facility] [place]    Is the patient currently in the state of [place]? YES    Visit mode: VIDEO    If the visit is dropped, the patient can be reconnected by:VIDEO VISIT: Text to cell phone:   Telephone Information:   Mobile [phone]       Will anyone else be joining the visit? NO  (If patient encounters technical issues they should call [phone])    Are changes needed to the allergy or medication list? No    Are refills needed on medications prescribed by this physician? NO    Rooming Documentation:  Questionnaire(s) completed    Reason for visit: RECHECK    Emili PRESSLEYF

## 2025-01-27 NOTE — PROGRESS NOTES
"Virtual Visit Details    Type of service:  Video Visit     Originating Location (pt. Location): {video visit patient location:294054::\"Home\"}  {PROVIDER LOCATION On-site should be selected for visits conducted from your clinic location or adjoining [facility] hospital, academic office, or other nearby [facility] building. Off-site should be selected for all other provider locations, including home:875493}  Distant Location (provider location):  {virtual location provider:786411}  Platform used for Video Visit: {Virtual Visit Platforms:657778::\"[employer]\"}    "

## 2025-01-27 NOTE — LETTER
1/27/2025      Christie Phoenix  [address] [facility] [address]      Dear Colleague,    Thank you for referring your patient, Christie Phoenix, to the [facility] [place]. Please see a copy of my visit note below.      [facility]  LIVER CLINIC FOLLOW UP  VIDEO VISIT    ASSESSMENT/PLAN:  Ms. Soriano is a 39 Y F with chronic HBV controlled on TDF.    Chronic HBV Stable, with no new symptoms. Has been on TDF since 2011, no insurance or pharmacy issues. Fibrosis scan 2018 F0-F1 and repeat 11/2023 F0-F1, age <50 and no family history of liver cancer, so no HCC screening at this time.    Colorectal Cancer Screening Due at age 44  PLAN:  1. Continue TDF. Renewed  2. Labs every 6 months, ordered    Return to clinic in 12 months. In person or via video. I let patient know if they would like to see me in person and they are told there are no appointments available or that we are booking out too far, they should send me a message and I will always find a time to see the patient in person if preferred/desired/needed.      Samra Steibnerg MD    Hepatology/Liver Transplant  Medical Director, Liver Transplantation  [facility]    Appointments [phone]  Clinic Fax [phone]  Transplant Care [phone] Option 4  Transplant Fax [phone]  Administrative Office [phone]  Administrative Fax [phone]  ===================================================================    HPI: Ms. Phoenix is a 39 Y F  with chronic HBV on TDF, seen for follow up.  She is doing well.     She has no complains today. She is compliant with her medications and has not had any trouble obtaining them from the pharmacy. She has not been without medication.    Fibrosis scan in 2018 with F0-F1 fibrosis.   US elastography 11/2023 showed the same. Included standard US which was normal.    No alcohol or tobacco use  She has no family history of liver cancer    HBV DNA 1/16/25 not  detected  Lab Test 01/16/25  1821   PROTTOTAL 7.4   ALBUMIN 4.4   BILITOTAL 0.6   ALKPHOS 52   AST 23   ALT 20     Lab Test 01/16/25  1821   WBC 7.2   RBC 4.59   HGB 14.1   HCT 42.4   MCV 92   MCH 30.7   MCHC 33.3   RDW 12.1        MELD 3.0: 7 at 1/16/2025  6:21 PM  MELD-Na: 6 at 1/16/2025  6:21 PM  Calculated from:  Serum Creatinine: 0.72 mg/dL (Using min of 1 mg/dL) at 1/16/2025  6:21 PM  Serum Sodium: 138 mmol/L (Using max of 137 mmol/L) at 1/16/2025  6:21 PM  Total Bilirubin: 0.6 mg/dL (Using min of 1 mg/dL) at 1/16/2025  6:21 PM  Serum Albumin: 4.4 g/dL (Using max of 3.5 g/dL) at 1/16/2025  6:21 PM  INR(ratio): 0.97 (Using min of 1) at 1/16/2025  6:21 PM  Age at listing (hypothetical): 39 years  Sex: Female at 1/16/2025  6:21 PM      Liver Function Studies - Recent Labs   Lab Test 01/17/24  1213   PROTTOTAL 7.2   ALBUMIN 4.3   BILITOTAL 0.7   ALKPHOS 54   AST 22   ALT 18     CBC RESULTS: Recent Labs   Lab Test 01/17/24  1213   WBC 4.8   RBC 4.61   HGB 13.8   HCT 43.3   MCV 94   MCH 29.9   MCHC 31.9   RDW 12.1        HBV DNA 1/17/24 not detected    Labs 1/25/23  Hgb 13.3,  WBC 5.7  AST 20, ALT 19, ALP 48, TB 0.8  INR 1.0  BMP nl.  HBV DNA  Not done    Exam  Gen Alert pleasant NAD  Resp No difficulty breathing. No cough  Skin No Jaundice  Eyes No icterus  Neuro KOEHLER  MSK no muscle wasting  Psyche Pleasant, appropriate. Well groomed.  Christie Phoenix is a 39 year old female who is being evaluated via a billable video visit.    Video-Visit Details  Type of service:  Video Visit  Video Start Time:839  Video End Time:849  Originating Location (pt. Location):home  Distant Location (provider location):  [facility] [place]      Platform used for Video Visit: [email or web address] or [employer]        The longitudinal plan of care for the diagnosis(es)/condition(s) as documented were addressed during this visit. Due to the added complexity in care, I will continue to support Neelam in the  subsequent management and with ongoing continuity of care.          Again, thank you for allowing me to participate in the care of your patient.        Sincerely,        Samra Steinberg MD    Electronically signed

## 2025-04-21 NOTE — NURSING NOTE
"Chief Complaint   Patient presents with     Eye Problem     Derm Problem     around mouth        Initial /75  Pulse 61  Temp 98.1  F (36.7  C) (Oral)  Wt 118 lb (53.5 kg)  SpO2 99%  BMI 24.66 kg/m2 Estimated body mass index is 24.66 kg/(m^2) as calculated from the following:    Height as of 1/26/14: 4' 10\" (1.473 m).    Weight as of this encounter: 118 lb (53.5 kg).  Medication Reconciliation: complete   Barbara Fischer CMA    " PAST SURGICAL HISTORY:  No significant past surgical history

## 2025-07-12 ENCOUNTER — HEALTH MAINTENANCE LETTER (OUTPATIENT)
Age: 40
End: 2025-07-12